# Patient Record
Sex: FEMALE | Race: BLACK OR AFRICAN AMERICAN | NOT HISPANIC OR LATINO | URBAN - METROPOLITAN AREA
[De-identification: names, ages, dates, MRNs, and addresses within clinical notes are randomized per-mention and may not be internally consistent; named-entity substitution may affect disease eponyms.]

---

## 2018-08-25 ENCOUNTER — EMERGENCY (EMERGENCY)
Facility: HOSPITAL | Age: 83
LOS: 0 days | Discharge: HOME | End: 2018-08-25
Attending: EMERGENCY MEDICINE
Payer: MEDICARE

## 2018-08-25 VITALS
HEART RATE: 64 BPM | OXYGEN SATURATION: 99 % | SYSTOLIC BLOOD PRESSURE: 142 MMHG | RESPIRATION RATE: 16 BRPM | WEIGHT: 136.03 LBS | TEMPERATURE: 98 F | DIASTOLIC BLOOD PRESSURE: 72 MMHG | HEIGHT: 65 IN

## 2018-08-25 DIAGNOSIS — R55 SYNCOPE AND COLLAPSE: ICD-10-CM

## 2018-08-25 LAB
ALBUMIN SERPL ELPH-MCNC: 3.5 G/DL — SIGNIFICANT CHANGE UP (ref 3.3–5)
ALP SERPL-CCNC: 82 U/L — SIGNIFICANT CHANGE UP (ref 40–120)
ALT FLD-CCNC: 20 U/L — SIGNIFICANT CHANGE UP (ref 12–78)
ANION GAP SERPL CALC-SCNC: 9 MMOL/L — SIGNIFICANT CHANGE UP (ref 5–17)
APPEARANCE UR: ABNORMAL
APTT BLD: 32.2 SEC — SIGNIFICANT CHANGE UP (ref 27.5–37.4)
AST SERPL-CCNC: 20 U/L — SIGNIFICANT CHANGE UP (ref 15–37)
BILIRUB SERPL-MCNC: 0.4 MG/DL — SIGNIFICANT CHANGE UP (ref 0.2–1.2)
BILIRUB UR-MCNC: NEGATIVE — SIGNIFICANT CHANGE UP
BUN SERPL-MCNC: 18 MG/DL — SIGNIFICANT CHANGE UP (ref 7–23)
CALCIUM SERPL-MCNC: 8.9 MG/DL — SIGNIFICANT CHANGE UP (ref 8.5–10.1)
CHLORIDE SERPL-SCNC: 103 MMOL/L — SIGNIFICANT CHANGE UP (ref 96–108)
CK MB CFR SERPL CALC: 1.3 NG/ML — SIGNIFICANT CHANGE UP (ref 0.5–3.6)
CO2 SERPL-SCNC: 31 MMOL/L — SIGNIFICANT CHANGE UP (ref 22–31)
COLOR SPEC: YELLOW — SIGNIFICANT CHANGE UP
CREAT SERPL-MCNC: 1.07 MG/DL — SIGNIFICANT CHANGE UP (ref 0.5–1.3)
DIFF PNL FLD: ABNORMAL
ETHANOL SERPL-MCNC: <10 MG/DL — SIGNIFICANT CHANGE UP (ref 0–10)
GLUCOSE BLDC GLUCOMTR-MCNC: 142 MG/DL — HIGH (ref 70–99)
GLUCOSE SERPL-MCNC: 151 MG/DL — HIGH (ref 70–99)
GLUCOSE UR QL: NEGATIVE MG/DL — SIGNIFICANT CHANGE UP
HCT VFR BLD CALC: 41.3 % — SIGNIFICANT CHANGE UP (ref 34.5–45)
HGB BLD-MCNC: 12.9 G/DL — SIGNIFICANT CHANGE UP (ref 11.5–15.5)
INR BLD: 1.04 RATIO — SIGNIFICANT CHANGE UP (ref 0.88–1.16)
KETONES UR-MCNC: NEGATIVE — SIGNIFICANT CHANGE UP
LEUKOCYTE ESTERASE UR-ACNC: ABNORMAL
MCHC RBC-ENTMCNC: 27 PG — SIGNIFICANT CHANGE UP (ref 27–34)
MCHC RBC-ENTMCNC: 31.2 GM/DL — LOW (ref 32–36)
MCV RBC AUTO: 86.4 FL — SIGNIFICANT CHANGE UP (ref 80–100)
NITRITE UR-MCNC: NEGATIVE — SIGNIFICANT CHANGE UP
NRBC # BLD: 0 /100 WBCS — SIGNIFICANT CHANGE UP (ref 0–0)
NT-PROBNP SERPL-SCNC: 309 PG/ML — SIGNIFICANT CHANGE UP (ref 0–450)
PCP SPEC-MCNC: SIGNIFICANT CHANGE UP
PH UR: 6 — SIGNIFICANT CHANGE UP (ref 5–8)
PLATELET # BLD AUTO: 181 K/UL — SIGNIFICANT CHANGE UP (ref 150–400)
POTASSIUM SERPL-MCNC: 3.8 MMOL/L — SIGNIFICANT CHANGE UP (ref 3.5–5.3)
POTASSIUM SERPL-SCNC: 3.8 MMOL/L — SIGNIFICANT CHANGE UP (ref 3.5–5.3)
PROT SERPL-MCNC: 7.1 GM/DL — SIGNIFICANT CHANGE UP (ref 6–8.3)
PROT UR-MCNC: 30 MG/DL
PROTHROM AB SERPL-ACNC: 11.3 SEC — SIGNIFICANT CHANGE UP (ref 9.8–12.7)
RBC # BLD: 4.78 M/UL — SIGNIFICANT CHANGE UP (ref 3.8–5.2)
RBC # FLD: 13.6 % — SIGNIFICANT CHANGE UP (ref 10.3–14.5)
SODIUM SERPL-SCNC: 143 MMOL/L — SIGNIFICANT CHANGE UP (ref 135–145)
SP GR SPEC: 1.01 — SIGNIFICANT CHANGE UP (ref 1.01–1.02)
TROPONIN I SERPL-MCNC: <.015 NG/ML — SIGNIFICANT CHANGE UP (ref 0.01–0.04)
UROBILINOGEN FLD QL: 1 MG/DL
WBC # BLD: 4.78 K/UL — SIGNIFICANT CHANGE UP (ref 3.8–10.5)
WBC # FLD AUTO: 4.78 K/UL — SIGNIFICANT CHANGE UP (ref 3.8–10.5)

## 2018-08-25 PROCEDURE — 71045 X-RAY EXAM CHEST 1 VIEW: CPT | Mod: 26

## 2018-08-25 PROCEDURE — 99285 EMERGENCY DEPT VISIT HI MDM: CPT

## 2018-08-25 PROCEDURE — 70450 CT HEAD/BRAIN W/O DYE: CPT | Mod: 26

## 2018-08-25 RX ORDER — SODIUM CHLORIDE 9 MG/ML
1000 INJECTION INTRAMUSCULAR; INTRAVENOUS; SUBCUTANEOUS ONCE
Qty: 0 | Refills: 0 | Status: COMPLETED | OUTPATIENT
Start: 2018-08-25 | End: 2018-08-25

## 2018-08-25 RX ADMIN — SODIUM CHLORIDE 1000 MILLILITER(S): 9 INJECTION INTRAMUSCULAR; INTRAVENOUS; SUBCUTANEOUS at 19:45

## 2018-08-25 NOTE — ED ADULT NURSE NOTE - NSFALLRSKUNASSIST_ED_ALL_ED
[address]  Social Work Navigator Encounter     Patient Name:  Montey Riedel    Medical History:     Advance Directives:    Narrative: [facility] faxed DDNR for oncologist to Sign - NP Cathryne Sample signed and PSR scanned into the chart. SW contacted hospice and informed them the document was now in patients chart. Barriers to Care:     Plan:   1. Continue to provide support as needed. no

## 2018-08-25 NOTE — ED ADULT NURSE NOTE - NSIMPLEMENTINTERV_GEN_ALL_ED
Implemented All Universal Safety Interventions:  [place] to call system. Call bell, personal items and telephone within reach. Instruct patient to call for assistance. Room bathroom lighting operational. Non-slip footwear when patient is off stretcher. Physically safe environment: no spills, clutter or unnecessary equipment. Stretcher in lowest position, wheels locked, appropriate side rails in place.

## 2018-08-25 NOTE — ED PROVIDER NOTE - PROGRESS NOTE DETAILS
Cliff: pt signed out to me syncope while sitting in heat, pending ct and labs. pt with several syncopal episodes over past few months, has been admitted several times, had eeg's, full cardio work up all of which has been neg perpt. states has pcp appointment in 1 day (this monday), and has continued neuro/cardiology. feels completely fine and would like to go home if ED work up is negative. Cliff: labs, ct, xr, and urine without significant findings. VS wnl, pt feels at baseline and states this was no different from prior events for which she has had extensive work up and has close pcp f/u. States usually happens when she is in heat like today. given results, and strict return precautions, pt requesting d/c. stable or d/c.

## 2018-08-25 NOTE — ED ADULT TRIAGE NOTE - CHIEF COMPLAINT QUOTE
as per ems pt synopsized while at the park. history of htn and asthma. pt states she remembers sitting on the park bench when she started feeling hot. does not remember anything else. as per ems pt did not fall. she was found in a sitting position on the bench. no slureed speech, facial droop or weakness noted at triage. .

## 2018-08-25 NOTE — ED PROVIDER NOTE - MEDICAL DECISION MAKING DETAILS
88 yo F syncope on park bench, no deficits currently, at baseline  -basic labs, bnp, trop, drug screen, etoh, ckmb, coags, ua, cx, ekg, cxr, ct brain  -hydration with NS, finger stick, monitor in ER, rectal temp if pt. allows  -f/u results, reeval

## 2018-08-25 NOTE — ED PROVIDER NOTE - PHYSICAL EXAMINATION
Vitals: WNL  Gen: AAOx3, NAD, sitting comfortably in stretcher, calm, cooperative, non-toxic, pleasant demeanor  Head: ncat, perrla, eomi b/l  Neck: supple, no lymphadenopathy, no midline deviation  Heart: rrr, no m/r/g  Lungs: CTA b/l, no rales/ronchi/wheezes  Abd: soft, nontender, non-distended, no rebound or guarding  Ext: no clubbing/cyanosis/edema  Neuro: sensation and muscle strength intact b/l, CN2-12 intact b/l

## 2018-08-25 NOTE — ED PROVIDER NOTE - OBJECTIVE STATEMENT
86 yo F bibems after passing out in park while sitting on bench.  Pt. says she felt fine, she was just sitting outside.  The next thing she remembers was waking up in the ambulance.  She's not sure how long she was down for, no inciting event, stressor, cp or palpitations prior to the event.  She was in her normal state of health.  No other effects, currently.  ROS: negative for fever, cough, headache, chest pain, shortness of breath, abd pain, nausea, vomiting, diarrhea, rash, paresthesia, and weakness--all other systems reviewed are negative.   PMH: asthma, HTN; Meds: See EMR; SH: Denies smoking/drinking/drug use 86 yo F bibems after passing out in park while sitting on bench.  Pt. says she felt fine, she was just sitting outside.  The next thing she remembers was waking up in the ambulance.  She's not sure how long she was down for, no inciting event, stressor, cp or palpitations prior to the event.  She was in her normal state of health.  No other effects, currently.  Per EMS report, pt. found unresponsive on park bench after being in the sun "all day."  ROS: negative for fever, cough, headache, chest pain, shortness of breath, abd pain, nausea, vomiting, diarrhea, rash, paresthesia, and weakness--all other systems reviewed are negative.   PMH: asthma, HTN; Meds: See EMR; SH: Denies smoking/drinking/drug use

## 2018-08-26 LAB
CULTURE RESULTS: SIGNIFICANT CHANGE UP
SPECIMEN SOURCE: SIGNIFICANT CHANGE UP

## 2023-05-18 ENCOUNTER — INPATIENT (INPATIENT)
Facility: HOSPITAL | Age: 88
LOS: 3 days | Discharge: HOME | End: 2023-05-22
Attending: HOSPITALIST | Admitting: HOSPITALIST
Payer: MEDICARE

## 2023-05-18 VITALS
TEMPERATURE: 98 F | HEART RATE: 110 BPM | DIASTOLIC BLOOD PRESSURE: 71 MMHG | SYSTOLIC BLOOD PRESSURE: 157 MMHG | OXYGEN SATURATION: 94 % | RESPIRATION RATE: 17 BRPM

## 2023-05-18 DIAGNOSIS — K52.9 NONINFECTIVE GASTROENTERITIS AND COLITIS, UNSPECIFIED: ICD-10-CM

## 2023-05-18 LAB
ALBUMIN SERPL ELPH-MCNC: 4.6 G/DL — SIGNIFICANT CHANGE UP (ref 3.3–5)
ALP SERPL-CCNC: 85 U/L — SIGNIFICANT CHANGE UP (ref 40–120)
ALT FLD-CCNC: 11 U/L — SIGNIFICANT CHANGE UP (ref 4–33)
ANION GAP SERPL CALC-SCNC: 14 MMOL/L — SIGNIFICANT CHANGE UP (ref 7–14)
AST SERPL-CCNC: 20 U/L — SIGNIFICANT CHANGE UP (ref 4–32)
BASOPHILS # BLD AUTO: 0.03 K/UL — SIGNIFICANT CHANGE UP (ref 0–0.2)
BASOPHILS NFR BLD AUTO: 0.5 % — SIGNIFICANT CHANGE UP (ref 0–2)
BILIRUB SERPL-MCNC: 0.5 MG/DL — SIGNIFICANT CHANGE UP (ref 0.2–1.2)
BUN SERPL-MCNC: 13 MG/DL — SIGNIFICANT CHANGE UP (ref 7–23)
CALCIUM SERPL-MCNC: 10.1 MG/DL — SIGNIFICANT CHANGE UP (ref 8.4–10.5)
CHLORIDE SERPL-SCNC: 98 MMOL/L — SIGNIFICANT CHANGE UP (ref 98–107)
CO2 SERPL-SCNC: 29 MMOL/L — SIGNIFICANT CHANGE UP (ref 22–31)
CREAT SERPL-MCNC: 0.83 MG/DL — SIGNIFICANT CHANGE UP (ref 0.5–1.3)
EGFR: 66 ML/MIN/1.73M2 — SIGNIFICANT CHANGE UP
EOSINOPHIL # BLD AUTO: 0.12 K/UL — SIGNIFICANT CHANGE UP (ref 0–0.5)
EOSINOPHIL NFR BLD AUTO: 2 % — SIGNIFICANT CHANGE UP (ref 0–6)
GLUCOSE SERPL-MCNC: 185 MG/DL — HIGH (ref 70–99)
HCT VFR BLD CALC: 46.4 % — HIGH (ref 34.5–45)
HGB BLD-MCNC: 14.4 G/DL — SIGNIFICANT CHANGE UP (ref 11.5–15.5)
IANC: 4.33 K/UL — SIGNIFICANT CHANGE UP (ref 1.8–7.4)
IMM GRANULOCYTES NFR BLD AUTO: 0.2 % — SIGNIFICANT CHANGE UP (ref 0–0.9)
LIDOCAIN IGE QN: 50 U/L — SIGNIFICANT CHANGE UP (ref 7–60)
LYMPHOCYTES # BLD AUTO: 1.09 K/UL — SIGNIFICANT CHANGE UP (ref 1–3.3)
LYMPHOCYTES # BLD AUTO: 18.4 % — SIGNIFICANT CHANGE UP (ref 13–44)
MAGNESIUM SERPL-MCNC: 2.3 MG/DL — SIGNIFICANT CHANGE UP (ref 1.6–2.6)
MCHC RBC-ENTMCNC: 26.3 PG — LOW (ref 27–34)
MCHC RBC-ENTMCNC: 31 GM/DL — LOW (ref 32–36)
MCV RBC AUTO: 84.8 FL — SIGNIFICANT CHANGE UP (ref 80–100)
MONOCYTES # BLD AUTO: 0.33 K/UL — SIGNIFICANT CHANGE UP (ref 0–0.9)
MONOCYTES NFR BLD AUTO: 5.6 % — SIGNIFICANT CHANGE UP (ref 2–14)
NEUTROPHILS # BLD AUTO: 4.33 K/UL — SIGNIFICANT CHANGE UP (ref 1.8–7.4)
NEUTROPHILS NFR BLD AUTO: 73.3 % — SIGNIFICANT CHANGE UP (ref 43–77)
NRBC # BLD: 0 /100 WBCS — SIGNIFICANT CHANGE UP (ref 0–0)
NRBC # FLD: 0 K/UL — SIGNIFICANT CHANGE UP (ref 0–0)
PHOSPHATE SERPL-MCNC: 3.6 MG/DL — SIGNIFICANT CHANGE UP (ref 2.5–4.5)
PLATELET # BLD AUTO: 250 K/UL — SIGNIFICANT CHANGE UP (ref 150–400)
POTASSIUM SERPL-MCNC: 3.6 MMOL/L — SIGNIFICANT CHANGE UP (ref 3.5–5.3)
POTASSIUM SERPL-SCNC: 3.6 MMOL/L — SIGNIFICANT CHANGE UP (ref 3.5–5.3)
PROT SERPL-MCNC: 7.9 G/DL — SIGNIFICANT CHANGE UP (ref 6–8.3)
RBC # BLD: 5.47 M/UL — HIGH (ref 3.8–5.2)
RBC # FLD: 14 % — SIGNIFICANT CHANGE UP (ref 10.3–14.5)
SODIUM SERPL-SCNC: 141 MMOL/L — SIGNIFICANT CHANGE UP (ref 135–145)
TROPONIN T, HIGH SENSITIVITY RESULT: 21 NG/L — SIGNIFICANT CHANGE UP
TROPONIN T, HIGH SENSITIVITY RESULT: 25 NG/L — SIGNIFICANT CHANGE UP
WBC # BLD: 5.91 K/UL — SIGNIFICANT CHANGE UP (ref 3.8–10.5)
WBC # FLD AUTO: 5.91 K/UL — SIGNIFICANT CHANGE UP (ref 3.8–10.5)

## 2023-05-18 PROCEDURE — 71045 X-RAY EXAM CHEST 1 VIEW: CPT | Mod: 26

## 2023-05-18 PROCEDURE — 99223 1ST HOSP IP/OBS HIGH 75: CPT

## 2023-05-18 PROCEDURE — 74177 CT ABD & PELVIS W/CONTRAST: CPT | Mod: 26,MA

## 2023-05-18 PROCEDURE — 99285 EMERGENCY DEPT VISIT HI MDM: CPT

## 2023-05-18 RX ORDER — PIPERACILLIN AND TAZOBACTAM 4; .5 G/20ML; G/20ML
3.38 INJECTION, POWDER, LYOPHILIZED, FOR SOLUTION INTRAVENOUS ONCE
Refills: 0 | Status: COMPLETED | OUTPATIENT
Start: 2023-05-18 | End: 2023-05-18

## 2023-05-18 RX ADMIN — PIPERACILLIN AND TAZOBACTAM 200 GRAM(S): 4; .5 INJECTION, POWDER, LYOPHILIZED, FOR SOLUTION INTRAVENOUS at 22:05

## 2023-05-18 NOTE — H&P ADULT - NSHPPHYSICALEXAM_GEN_ALL_CORE
Vital Signs Last 24 Hrs  T(C): 36.4 (18 May 2023 22:00), Max: 37.2 (18 May 2023 18:03)  T(F): 97.6 (18 May 2023 22:00), Max: 98.9 (18 May 2023 18:03)  HR: 71 (18 May 2023 22:00) (71 - 110)  BP: 159/75 (18 May 2023 22:00) (157/71 - 166/72)  RR: 20 (18 May 2023 22:00) (17 - 20)  SpO2: 96% (18 May 2023 22:00) (94% - 98%)    Parameters below as of 18 May 2023 22:00  Patient On (Oxygen Delivery Method): room air Vital Signs Last 24 Hrs  T(C): 36.4 (18 May 2023 22:00), Max: 37.2 (18 May 2023 18:03)  T(F): 97.6 (18 May 2023 22:00), Max: 98.9 (18 May 2023 18:03)  HR: 71 (18 May 2023 22:00) (71 - 110)  BP: 159/75 (18 May 2023 22:00) (157/71 - 166/72)  RR: 20 (18 May 2023 22:00) (17 - 20)  SpO2: 96% (18 May 2023 22:00) (94% - 98%)    Parameters below as of 18 May 2023 22:00  Patient On (Oxygen Delivery Method): room air    PHYSICAL EXAM:  GENERAL: NAD, well-developed, well-nourished  HEAD:  Atraumatic, Normocephalic  EYES: conjunctiva and sclera clear  NECK: Supple, No JVD  CHEST/LUNG: Clear to auscultation bilaterally; No wheezes, rales or rhonchi; normal work of breathing, speaking in full sentences  HEART: Regular rate and rhythm; No murmurs, rubs, or gallops, (+)S1, S2  ABDOMEN: Soft, Nondistended; Normal Bowel sounds; (+) mild epigastric TTP  EXTREMITIES:  2+ Peripheral Pulses, No clubbing, cyanosis, or edema  PSYCH: normal mood and affect, A&Ox3  NEUROLOGY: no focal neuro deficits  SKIN: No rashes or lesions

## 2023-05-18 NOTE — H&P ADULT - HISTORY OF PRESENT ILLNESS
89-year-old female with     In the ED VS:  98.9    157-166/71-72  17  94-98%RA, received pip/tazo 3.375g IVPB x1 89-year-old female with asthma, breast cancer s/p excision/RT, HTN, presenting from home with an episode of presyncope that patient reports starting upon standing. She notes feeling associated nausea and vomiting. She denies any abdominal pain. She reports constipation x2d, took Miralax yesterday and noted 2 large BM earlier today followed by dark liquid stool. She denies any sick contacts, history of travel (aside from traveling from [place] approximately 1 month ago), or any changes in diet. Patient reports possible remote history of colitis in the past. Of note, patient reports recent hemorrhoidectomy approximately 2 weeks ago in [place] (timing conflicting with when she reports returning from [place], 1 month ago).    In the ED VS:  98.9    157-166/71-72  17  94-98%RA, received pip/tazo 3.375g IVPB x1

## 2023-05-18 NOTE — ED PROVIDER NOTE - OBJECTIVE STATEMENT
89 year old female with hx of asthma, breast cancer, and HTN comes into the ED for eval of lightheadedness with episode of vomiting earlier today. She states she did not have an LOC but she felt presyncopal which lasted for a few mins. After the sensation improved, she proceeded to have an episode of vomiting and diarrhea. She has not had any sensation of lightheadedness or dizziness since, she denies any chest pain, palpitations, or SOB. She has not had any recent fevers, chills, abd pain, or urinary symptoms.

## 2023-05-18 NOTE — ED PROVIDER NOTE - PHYSICAL EXAMINATION
GENERAL: Not in acute distress, non-toxic appearing  HEAD: normocephalic, atraumatic  HEENT: PERRLA, EOMI, normal conjunctiva, oral mucosa moist, neck supple  CARDIAC: regular rate and rhythm, normal S1 and S2,  no appreciable murmurs  PULM: clear to ascultation bilaterally, no crackles, rales, rhonchi, or wheezing  GI: abdomen nondistended, soft, nontender, no guarding or rebound tenderness  : No CVA tenderness, no suprapubic tenderness  NEURO: alert and oriented x 3, normal speech, no focal motor or sensory deficits, gait normal, no gross neurologic deficit  MSK: No visible deformities, no peripheral edema, no calf tenderness/redness/swelling  SKIN: No visible rashes, dry, well-perfused  PSYCH: appropriate mood and affect

## 2023-05-18 NOTE — H&P ADULT - PROBLEM SELECTOR PLAN 1
descending/rectosigmoid colitis  tachycardic on presentation, resolved  s/p pip/tazo in ED, will monitor off abx for now  check stool studies, clear liquid diet for bowel rest   monitor stool counts descending/rectosigmoid colitis  tachycardic on presentation, resolved  s/p pip/tazo in ED, will monitor off abx for now  check stool studies, clear liquid diet for bowel rest   monitor stool counts  reports constipation x2d then took Miralax with 2 large formed BM followed by diarrhea

## 2023-05-18 NOTE — ED PROVIDER NOTE - PROGRESS NOTE DETAILS
Pt is now complaining of abd pain and has LLQ tenderness. She had another episode of diarrhea. Will send a GI PCR and CT scan abd Patricia CRUZ PGY-3: CT of the abdomen and pelvis significant for Colitis.  Patient reassessed at bedside, abdomen soft,  nontender and nondistended.  Patient states that she does not have any abdominal pain currently.  Patient would like to go home.   Will call and update family, and send antibiotics to pharmacy. Patricia CRUZ PGY-3: patient states that she lives with georgie at home who is away for the weekend and normally takes care of patient. will admit patient for iv abx.

## 2023-05-18 NOTE — H&P ADULT - ASSESSMENT
89-year-old female with asthma, breast cancer s/p excision/RT, HTN, presenting from home with an episode of presyncope, also found to have colitis.

## 2023-05-18 NOTE — ED PROVIDER NOTE - CLINICAL SUMMARY MEDICAL DECISION MAKING FREE TEXT BOX
Dawn: Witnessed syncopal episode at home f/b N/V/D. No pain. No CP/SOB/HA. Check EKG and trop and Hb. Likely admit vs. CDU.

## 2023-05-18 NOTE — ED ADULT NURSE REASSESSMENT NOTE - NS ED NURSE REASSESS COMMENT FT1
Report received from RNShirley. Pt appears comfortable in stretcher at this time, HOB elevated. NSR on bedside cardiac monitor. Respirations even and unlabored. 99% o2 saturation on room air. No acute distress noted. Speaking in full and complete sentences. Offers no complaint at this time. Denies CP, SOB, nausea, vomiting, headache, lightheadedness, dizziness, fever or chills. Bed in lowest position, call bell in reach, wheels locked, appropriate side rails up, safety maintained. Awaiting bed assignment as per admission orders.

## 2023-05-18 NOTE — H&P ADULT - PROBLEM SELECTOR PLAN 5
HSQ for DVT Ppx  PT consult to prevent deconditioning HSQ for DVT Ppx  PT consult to prevent deconditioning    CT showing possible cystitis, patient denies urinary symptoms, was recently treated for UTI a few weeks ago s/p 7d course of nitrofurantoin, UA Pending

## 2023-05-18 NOTE — ED PROVIDER NOTE - ATTENDING CONTRIBUTION TO CARE
I performed a face-to-face evaluation of the patient and performed a history and physical examination. I agree with the history and physical examination. If this was a PA visit, I personally saw the patient with the PA and performed a substantive portion of the visit including all aspects of the medical decision making.    Witnessed syncopal episode at home f/b N/V/D. No pain. No CP/SOB/HA. Check EKG and trop and Hb. Likely admit vs. CDU.

## 2023-05-18 NOTE — ED ADULT NURSE NOTE - OBJECTIVE STATEMENT
patient presents to ed c/o dizziness. endorsed feeling as if she was going to "pass out". pmhx of vertigo. reports feeling nauseous. denies cp, sob, vomiting or fever/chills

## 2023-05-18 NOTE — H&P ADULT - PROBLEM SELECTOR PLAN 2
patient reporting dizziness upon standing/presyncope, no LOC  check orthostatics, pending findings can check TTE  monitor on tele patient reporting dizziness upon standing/presyncope, no LOC  check orthostatics, pending findings can check TTE  monitor on tele  trop 25->21, no chest pain patient reporting dizziness upon standing/presyncope, no LOC  check orthostatics, pending findings can check TTE (reportedly had one recently performed in [place] 2mo ago)  monitor on tele  [email or web address] 25->21, no chest pain

## 2023-05-18 NOTE — ED ADULT TRIAGE NOTE - CHIEF COMPLAINT QUOTE
Pt brought in after witnessed syncopal episode at home. After regained consciousness pt had episode of nausea, vomiting, and diarrhea. Unable to obtain health information, FS by ems 170. Pt actively having diarrhea in triage. Charge R made aware.

## 2023-05-18 NOTE — H&P ADULT - NSHPLABSRESULTS_GEN_ALL_CORE
14.4   5.91  )-----------( 250      ( 18 May 2023 12:35 )             46.4     05-18    141  |  98  |  13  ----------------------------<  185<H>  3.6   |  29  |  0.83    Ca    10.1      18 May 2023 12:50  Phos  3.6     05-18  Mg     2.30     05-18    TPro  7.9  /  Alb  4.6  /  TBili  0.5  /  DBili  x   /  AST  20  /  ALT  11  /  AlkPhos  85  05-18    Lipase, Serum: 50 U/L (05.18.23 @ 12:50)    Troponin T, High Sensitivity Result: 21 ng/L (05.18.23 @ 15:12)  Troponin T, High Sensitivity Result: 25 ng/L (05.18.23 @ 12:50)    < from: Xray Chest 1 View- PORTABLE-Urgent (05.18.23 @ 15:22) >  LUNGS/PLEURA: The lungs are clear. No pleural effusion. No pneumothorax.  HEART AND MEDIASTINUM: The heart size is not accurately measured in this   projection.  OTHER: No acute osseous abnormalities.  IMPRESSION: Clear lungs.  < end of copied text >    < from: CT Abdomen and Pelvis w/ IV Cont (05.18.23 @ 17:44) >  LOWER CHEST: A 1.7 cm right breast nodule with coarse calcifications, which may correlate with the patient's history of breast cancer. Bibasilar dependent atelectasis.  LIVER: Within normal limits.  BILE DUCTS: Normal caliber.  GALLBLADDER: Within normal limits.  SPLEEN: Within normal limits.  PANCREAS: A 0.8 cm pancreatic neck cyst (301:36). Main pancreatic duct is normal in caliber.  ADRENALS: Within normal limits.  KIDNEYS/URETERS: Symmetrically enhancing parenchyma. Bilateral cysts. No hydronephrosis.  BLADDER: Circumferential bladder wall thickening that is disproportionate to underdistention, which may reflect cystitis, neurogenic bladder or a   combination of both.  REPRODUCTIVE ORGANS: Fibroid uterus. A 2.3 cm right adnexal cyst.  BOWEL: Descending and rectosigmoid colonic wall thickening with surrounding inflammatory changes, more prominent in the rectum, suggesting colitis. No bowel obstruction. Appendix is normal.  PERITONEUM: No ascites.  VESSELS: Atherosclerotic changes.  RETROPERITONEUM/LYMPH NODES: No lymphadenopathy.  ABDOMINAL WALL: Within normal limits.  BONES: Degenerative changes. Subacute/chronic right lower rib fractures.  IMPRESSION: Descending and rectosigmoid colitis. No bowel obstruction. Circumferential bladder wall thickening disproportionate to underdistention may reflect cystitis, neurogenic bladder or a combination of both. Correlate with urinalysis. A 1.7 cm right breast nodule may correlate with patient's history of breast cancer. Mammographic correlation is advised.   < end of copied text > 14.4   5.91  )-----------( 250      ( 18 May 2023 12:35 )             46.4     05-18    141  |  98  |  13  ----------------------------<  185<H>  3.6   |  29  |  0.83    Ca    10.1      18 May 2023 12:50  Phos  3.6     05-18  Mg     2.30     05-18    TPro  7.9  /  Alb  4.6  /  TBili  0.5  /  DBili  x   /  AST  20  /  ALT  11  /  AlkPhos  85  05-18    Lipase, Serum: 50 U/L (05.18.23 @ 12:50)    Troponin T, High Sensitivity Result: 21 ng/L (05.18.23 @ 15:12)  Troponin T, High Sensitivity Result: 25 ng/L (05.18.23 @ 12:50)    < from: Xray Chest 1 View- PORTABLE-Urgent (05.18.23 @ 15:22) >  LUNGS/PLEURA: The lungs are clear. No pleural effusion. No pneumothorax.  HEART AND MEDIASTINUM: The heart size is not accurately measured in this   projection.  OTHER: No acute osseous abnormalities.  IMPRESSION: Clear lungs.  < end of copied text >    < from: CT Abdomen and Pelvis w/ IV Cont (05.18.23 @ 17:44) >  LOWER CHEST: A 1.7 cm right breast nodule with coarse calcifications, which may correlate with the patient's history of breast cancer. Bibasilar dependent atelectasis.  LIVER: Within normal limits.  BILE DUCTS: Normal caliber.  GALLBLADDER: Within normal limits.  SPLEEN: Within normal limits.  PANCREAS: A 0.8 cm pancreatic neck cyst (301:36). Main pancreatic duct is normal in caliber.  ADRENALS: Within normal limits.  KIDNEYS/URETERS: Symmetrically enhancing parenchyma. Bilateral cysts. No hydronephrosis.  BLADDER: Circumferential bladder wall thickening that is disproportionate to underdistention, which may reflect cystitis, neurogenic bladder or a   combination of both.  REPRODUCTIVE ORGANS: Fibroid uterus. A 2.3 cm right adnexal cyst.  BOWEL: Descending and rectosigmoid colonic wall thickening with surrounding inflammatory changes, more prominent in the rectum, suggesting colitis. No bowel obstruction. Appendix is normal.  PERITONEUM: No ascites.  VESSELS: Atherosclerotic changes.  RETROPERITONEUM/LYMPH NODES: No lymphadenopathy.  ABDOMINAL WALL: Within normal limits.  BONES: Degenerative changes. Subacute/chronic right lower rib fractures.  IMPRESSION: Descending and rectosigmoid colitis. No bowel obstruction. Circumferential bladder wall thickening disproportionate to underdistention may reflect cystitis, neurogenic bladder or a combination of both. Correlate with urinalysis. A 1.7 cm right breast nodule may correlate with patient's history of breast cancer. Mammographic correlation is advised.   < end of copied text >    EKG personally reviewed and interpreted - NSR 68bpm, LAD, LVH, TWI II, III, aVF, biphasic T in V3, V5-V6; QTc 461ms (no prior EKGs available for comparison)

## 2023-05-18 NOTE — H&P ADULT - TIME BILLING
Preparing to see the patient including review of tests and other providers' notes, confirming history with patient, performing medical examination and evaluation, counseling and educating the patient, ordering medications, tests and procedures, documenting clinical information in the EMR, independently interpreting results and communicating results to the patient, care coordination

## 2023-05-18 NOTE — H&P ADULT - NSHPREVIEWOFSYSTEMS_GEN_ALL_CORE
REVIEW OF SYSTEMS:    CONSTITUTIONAL: No weakness, fevers or chills  EYES/ENT: No visual changes; No dysphagia; No sore throat; No rhinorrhea; No sinus pain/pressure  NECK: No pain or stiffness  RESPIRATORY: No cough, wheezing, hemoptysis; No shortness of breath  CARDIOVASCULAR: No chest pain or palpitations; No lower extremity edema  GASTROINTESTINAL: No abdominal or epigastric pain. (+) nausea, vomiting; No hematemesis; (+) constipation - resolved s/p Miralax yesterday, now w/diarrhea. No melena or hematochezia.  GENITOURINARY: No dysuria, frequency or hematuria  NEUROLOGICAL: No numbness, paresthesias, or weakness; No HA; (+) LH/dizziness  MSK: ambulates with cane; No falls  SKIN: No itching, burning, rashes, or lesions   All other review of systems is negative unless indicated above.

## 2023-05-18 NOTE — ED ADULT NURSE NOTE - NSFALLHARMRISKINTERV_ED_ALL_ED

## 2023-05-19 DIAGNOSIS — N63.0 UNSPECIFIED LUMP IN UNSPECIFIED BREAST: ICD-10-CM

## 2023-05-19 DIAGNOSIS — K86.2 CYST OF PANCREAS: ICD-10-CM

## 2023-05-19 DIAGNOSIS — Z98.890 OTHER SPECIFIED POSTPROCEDURAL STATES: Chronic | ICD-10-CM

## 2023-05-19 DIAGNOSIS — K52.9 NONINFECTIVE GASTROENTERITIS AND COLITIS, UNSPECIFIED: ICD-10-CM

## 2023-05-19 DIAGNOSIS — Z29.9 ENCOUNTER FOR PROPHYLACTIC MEASURES, UNSPECIFIED: ICD-10-CM

## 2023-05-19 DIAGNOSIS — R42 DIZZINESS AND GIDDINESS: ICD-10-CM

## 2023-05-19 LAB
A1C WITH ESTIMATED AVERAGE GLUCOSE RESULT: 7 % — HIGH (ref 4–5.6)
ANION GAP SERPL CALC-SCNC: 13 MMOL/L — SIGNIFICANT CHANGE UP (ref 7–14)
BASOPHILS # BLD AUTO: 0.04 K/UL — SIGNIFICANT CHANGE UP (ref 0–0.2)
BASOPHILS NFR BLD AUTO: 0.7 % — SIGNIFICANT CHANGE UP (ref 0–2)
BUN SERPL-MCNC: 14 MG/DL — SIGNIFICANT CHANGE UP (ref 7–23)
CALCIUM SERPL-MCNC: 9.4 MG/DL — SIGNIFICANT CHANGE UP (ref 8.4–10.5)
CHLORIDE SERPL-SCNC: 97 MMOL/L — LOW (ref 98–107)
CHOLEST SERPL-MCNC: 192 MG/DL — SIGNIFICANT CHANGE UP
CO2 SERPL-SCNC: 27 MMOL/L — SIGNIFICANT CHANGE UP (ref 22–31)
CREAT SERPL-MCNC: 0.87 MG/DL — SIGNIFICANT CHANGE UP (ref 0.5–1.3)
EGFR: 62 ML/MIN/1.73M2 — SIGNIFICANT CHANGE UP
EOSINOPHIL # BLD AUTO: 0.14 K/UL — SIGNIFICANT CHANGE UP (ref 0–0.5)
EOSINOPHIL NFR BLD AUTO: 2.4 % — SIGNIFICANT CHANGE UP (ref 0–6)
ESTIMATED AVERAGE GLUCOSE: 154 — SIGNIFICANT CHANGE UP
GLUCOSE SERPL-MCNC: 90 MG/DL — SIGNIFICANT CHANGE UP (ref 70–99)
HCT VFR BLD CALC: 39.9 % — SIGNIFICANT CHANGE UP (ref 34.5–45)
HDLC SERPL-MCNC: 52 MG/DL — SIGNIFICANT CHANGE UP
HGB BLD-MCNC: 13.1 G/DL — SIGNIFICANT CHANGE UP (ref 11.5–15.5)
IANC: 2.98 K/UL — SIGNIFICANT CHANGE UP (ref 1.8–7.4)
IMM GRANULOCYTES NFR BLD AUTO: 0.2 % — SIGNIFICANT CHANGE UP (ref 0–0.9)
LIPID PNL WITH DIRECT LDL SERPL: 124 MG/DL — HIGH
LYMPHOCYTES # BLD AUTO: 2.08 K/UL — SIGNIFICANT CHANGE UP (ref 1–3.3)
LYMPHOCYTES # BLD AUTO: 35.1 % — SIGNIFICANT CHANGE UP (ref 13–44)
MAGNESIUM SERPL-MCNC: 1.7 MG/DL — SIGNIFICANT CHANGE UP (ref 1.6–2.6)
MCHC RBC-ENTMCNC: 27.2 PG — SIGNIFICANT CHANGE UP (ref 27–34)
MCHC RBC-ENTMCNC: 32.8 GM/DL — SIGNIFICANT CHANGE UP (ref 32–36)
MCV RBC AUTO: 82.8 FL — SIGNIFICANT CHANGE UP (ref 80–100)
MONOCYTES # BLD AUTO: 0.68 K/UL — SIGNIFICANT CHANGE UP (ref 0–0.9)
MONOCYTES NFR BLD AUTO: 11.5 % — SIGNIFICANT CHANGE UP (ref 2–14)
NEUTROPHILS # BLD AUTO: 2.98 K/UL — SIGNIFICANT CHANGE UP (ref 1.8–7.4)
NEUTROPHILS NFR BLD AUTO: 50.1 % — SIGNIFICANT CHANGE UP (ref 43–77)
NON HDL CHOLESTEROL: 140 MG/DL — HIGH
NRBC # BLD: 0 /100 WBCS — SIGNIFICANT CHANGE UP (ref 0–0)
NRBC # FLD: 0 K/UL — SIGNIFICANT CHANGE UP (ref 0–0)
PHOSPHATE SERPL-MCNC: 3.2 MG/DL — SIGNIFICANT CHANGE UP (ref 2.5–4.5)
PLATELET # BLD AUTO: 202 K/UL — SIGNIFICANT CHANGE UP (ref 150–400)
POTASSIUM SERPL-MCNC: 3.2 MMOL/L — LOW (ref 3.5–5.3)
POTASSIUM SERPL-SCNC: 3.2 MMOL/L — LOW (ref 3.5–5.3)
RBC # BLD: 4.82 M/UL — SIGNIFICANT CHANGE UP (ref 3.8–5.2)
RBC # FLD: 13.8 % — SIGNIFICANT CHANGE UP (ref 10.3–14.5)
SODIUM SERPL-SCNC: 137 MMOL/L — SIGNIFICANT CHANGE UP (ref 135–145)
TRIGL SERPL-MCNC: 78 MG/DL — SIGNIFICANT CHANGE UP
TSH SERPL-MCNC: 0.33 UIU/ML — SIGNIFICANT CHANGE UP (ref 0.27–4.2)
WBC # BLD: 5.93 K/UL — SIGNIFICANT CHANGE UP (ref 3.8–10.5)
WBC # FLD AUTO: 5.93 K/UL — SIGNIFICANT CHANGE UP (ref 3.8–10.5)

## 2023-05-19 PROCEDURE — 99233 SBSQ HOSP IP/OBS HIGH 50: CPT

## 2023-05-19 RX ORDER — HEPARIN SODIUM 5000 [USP'U]/ML
5000 INJECTION INTRAVENOUS; SUBCUTANEOUS EVERY 12 HOURS
Refills: 0 | Status: DISCONTINUED | OUTPATIENT
Start: 2023-05-19 | End: 2023-05-22

## 2023-05-19 RX ORDER — AMLODIPINE BESYLATE 2.5 MG/1
0 TABLET ORAL
Qty: 0 | Refills: 1 | DISCHARGE

## 2023-05-19 RX ORDER — NITROFURANTOIN MACROCRYSTAL 50 MG
0 CAPSULE ORAL
Qty: 0 | Refills: 0 | DISCHARGE

## 2023-05-19 RX ORDER — POTASSIUM CHLORIDE 20 MEQ
40 PACKET (EA) ORAL ONCE
Refills: 0 | Status: COMPLETED | OUTPATIENT
Start: 2023-05-19 | End: 2023-05-19

## 2023-05-19 RX ORDER — CARVEDILOL PHOSPHATE 80 MG/1
6.25 CAPSULE, EXTENDED RELEASE ORAL EVERY 12 HOURS
Refills: 0 | Status: DISCONTINUED | OUTPATIENT
Start: 2023-05-19 | End: 2023-05-22

## 2023-05-19 RX ORDER — DULOXETINE HYDROCHLORIDE 30 MG/1
0 CAPSULE, DELAYED RELEASE ORAL
Qty: 0 | Refills: 0 | DISCHARGE

## 2023-05-19 RX ORDER — ASPIRIN/CALCIUM CARB/MAGNESIUM 324 MG
81 TABLET ORAL DAILY
Refills: 0 | Status: DISCONTINUED | OUTPATIENT
Start: 2023-05-19 | End: 2023-05-22

## 2023-05-19 RX ORDER — HYDROCORTISONE 1 %
0 OINTMENT (GRAM) TOPICAL
Qty: 0 | Refills: 0 | DISCHARGE

## 2023-05-19 RX ORDER — MECLIZINE HCL 12.5 MG
25 TABLET ORAL EVERY 8 HOURS
Refills: 0 | Status: DISCONTINUED | OUTPATIENT
Start: 2023-05-19 | End: 2023-05-22

## 2023-05-19 RX ADMIN — CARVEDILOL PHOSPHATE 6.25 MILLIGRAM(S): 80 CAPSULE, EXTENDED RELEASE ORAL at 17:39

## 2023-05-19 RX ADMIN — HEPARIN SODIUM 5000 UNIT(S): 5000 INJECTION INTRAVENOUS; SUBCUTANEOUS at 17:39

## 2023-05-19 RX ADMIN — Medication 81 MILLIGRAM(S): at 12:29

## 2023-05-19 RX ADMIN — Medication 40 MILLIEQUIVALENT(S): at 08:51

## 2023-05-19 RX ADMIN — CARVEDILOL PHOSPHATE 6.25 MILLIGRAM(S): 80 CAPSULE, EXTENDED RELEASE ORAL at 06:09

## 2023-05-19 RX ADMIN — HEPARIN SODIUM 5000 UNIT(S): 5000 INJECTION INTRAVENOUS; SUBCUTANEOUS at 06:09

## 2023-05-19 NOTE — PATIENT PROFILE ADULT - FALL HARM RISK - UNIVERSAL INTERVENTIONS
Bed in lowest position, wheels locked, appropriate side rails in place/Call bell, personal items and telephone in reach/Instruct patient to call for assistance before getting out of bed or chair/Non-slip footwear when patient is out of bed/[place] to call system/Physically safe environment - no spills, clutter or unnecessary equipment/Purposeful Proactive Rounding/Room/bathroom lighting operational, light cord in reach

## 2023-05-19 NOTE — DISCHARGE NOTE PROVIDER - NSDCCPCAREPLAN_GEN_ALL_CORE_FT
PRINCIPAL DISCHARGE DIAGNOSIS  Diagnosis: Colitis  Assessment and Plan of Treatment:       SECONDARY DISCHARGE DIAGNOSES  Diagnosis: Breast nodule  Assessment and Plan of Treatment: you will need to follow up w/ your PCP for further management     PRINCIPAL DISCHARGE DIAGNOSIS  Diagnosis: Colitis  Assessment and Plan of Treatment: You were monitored after receiving a short course of antibiotics in the hospital.  Follow up with your primary care physician for further monitoring in 1-2 weeks. Please call to arrange appointment.        SECONDARY DISCHARGE DIAGNOSES  Diagnosis: Breast nodule  Assessment and Plan of Treatment: you will need to follow up w/ your PCP for further management     PRINCIPAL DISCHARGE DIAGNOSIS  Diagnosis: Colitis  Assessment and Plan of Treatment: You were monitored after receiving a short course of antibiotics in the hospital.  Follow up with your primary care physician for further monitoring in 1-2 weeks. Please call to arrange appointment.        SECONDARY DISCHARGE DIAGNOSES  Diagnosis: Pancreatic cyst  Assessment and Plan of Treatment: you will need to follow up w/ your PCP for further management    Diagnosis: Breast nodule  Assessment and Plan of Treatment: you will need to follow up w/ your PCP for further management    Diagnosis: Dizziness  Assessment and Plan of Treatment: No cause for your dizziness was identified.  Please follow up at a physical therapy office for vestibular rehab, which might help manage dizziness.

## 2023-05-19 NOTE — PROGRESS NOTE ADULT - TIME BILLING
Time spent includes direct patient care  (interview and examination of patient), discussion with other providers, support staff and/or patient's family members, review of medical records, ordering diagnostic tests and analyzing results, and documentation.

## 2023-05-19 NOTE — DISCHARGE NOTE PROVIDER - PROVIDER TOKENS
FREE:[LAST:[your primary care provider],PHONE:[(   )    -],FAX:[(   )    -],ADDRESS:[follow up in 1 week with your primary care provider]] FREE:[LAST:[your primary care provider],PHONE:[(   )    -],FAX:[(   )    -],ADDRESS:[follow up in 1 week with your primary care provider]],FREE:[LAST:[PCP],PHONE:[(   )    -],FAX:[(   )    -],ADDRESS:[follow up with Dr Lindsay call for appointment]]

## 2023-05-19 NOTE — PATIENT PROFILE ADULT - FALL HARM RISK - FALLEN IN PAST
Patient identified with 2 identifiers (name and ). Spoke with patient daughter Brian Krishnan and she states she has not heard any thing from [address]. New referral has been placed. Spoke with [facility] and they are going top reach out to Brian Krishnan patients daughter. No

## 2023-05-19 NOTE — DISCHARGE NOTE PROVIDER - CARE PROVIDER_API CALL
your primary care provider,   follow up in 1 week with your primary care provider  Phone: (   )    -  Fax: (   )    -  Follow Up Time:    your primary care provider,   follow up in 1 week with your primary care provider  Phone: (   )    -  Fax: (   )    -  Follow Up Time:     PCP,   follow up with Dr Lindsay call for appointment  Phone: (   )    -  Fax: (   )    -  Follow Up Time:

## 2023-05-19 NOTE — DISCHARGE NOTE PROVIDER - NSDCMRMEDTOKEN_GEN_ALL_CORE_FT
ALBUTEROL HFA INH(200 PUFFS)18GM: INHALE 1 PUFF BY MOUTH EVERY 4 TO 6 HOURS AS NEEDED  aspirin 81 mg oral tablet: 1 tab(s) orally once a day  CARVEDILOL 6.25MG TABLETS: TAKE 1 TABLET BY MOUTH TWICE DAILY  HYDROCHLOROTHIAZIDE 25MGTABLETS: TAKE 1 TABLET BY MOUTH EVERY DAY IN THE MORNING  LUBIPROSTONE 24MCG CAPSULES: 1 cap(s) orally once a day  MECLIZINE 25MG RX TABLETS: TAKE 1 TABLET BY MOUTH THREE TIMES DAILY AS NEEDED  METFORMIN 500MG TABLETS: TAKE 1 TABLET BY MOUTH DAILY WITH DINNER   ALBUTEROL HFA INH(200 PUFFS)18GM: INHALE 1 PUFF BY MOUTH EVERY 4 TO 6 HOURS AS NEEDED  aspirin 81 mg oral tablet: 1 tab(s) orally once a day  CARVEDILOL 6.25MG TABLETS: TAKE 1 TABLET BY MOUTH TWICE DAILY  HYDROCHLOROTHIAZIDE 25MGTABLETS: TAKE 1 TABLET BY MOUTH EVERY DAY IN THE MORNING  LUBIPROSTONE 24MCG CAPSULES: 1 cap(s) orally once a day  MECLIZINE 25MG RX TABLETS: TAKE 1 TABLET BY MOUTH THREE TIMES DAILY AS NEEDED  METFORMIN 500MG TABLETS: TAKE 1 TABLET BY MOUTH DAILY WITH DINNER  Vestibular Rehabilitation: please evaluate

## 2023-05-19 NOTE — DISCHARGE NOTE PROVIDER - ATTENDING DISCHARGE PHYSICAL EXAMINATION:
GENERAL: NAD  EYES: EOMI, conjunctiva and sclera clear  NECK: Supple, No JVD  CHEST/LUNG: Clear to auscultation bilaterally; No wheeze  HEART: Regular rate and rhythm; No murmurs, rubs, or gallops  ABDOMEN: Soft, Nontender, Nondistended; Bowel sounds present  EXTREMITIES:  2+ Peripheral Pulses, No clubbing, cyanosis, or edema  NEUROLOGY: non-focal  SKIN: No rashes or lesions

## 2023-05-19 NOTE — DISCHARGE NOTE PROVIDER - HOSPITAL COURSE
89-y/o F asthma, breast cancer s/p excision/RT, HTN, presenting from home with an episode of presyncope, also found to have colitis.    Colitis-descending/rectosigmoid colitis, s/p pip/tazo in ED, will monitor off abx for now  check stool studies  monitor stool counts  reports constipation x2d then took Miralax with 2 large formed BM followed by diarrhea  advance diet as tolerated.    Postural dizziness with presyncope.  no LOC  check orthostatics, pending findings can check TTE (reportedly had one recently performed in [place] 2mo ago)  monitor on [employer] 25->21, no chest pain.     Breast nodule-outpt f/u with PMD (patient to schedule).    Pancreatic cyst-outpt f/u with PMD for further w/u.   89-y/o F asthma, breast cancer s/p excision/RT, HTN, presenting from home with an episode of presyncope, also found to have colitis.  Diarrhea resolved.  Patient was not orthostatic.  PT eval : No needs. She has had recurring dizziness and had an inpatient workup recently in [place] with no etiology identified.  Diarrhea resolved and patient tolerated regular diet.       Breast nodule-outpt f/u with PMD (patient to schedule).    Pancreatic cyst-outpt f/u with PMD for further w/u.   89-y/o F h/o asthma, breast cancer s/p excision/RT, HTN, presenting from home with an episode of presyncope, also found to have colitis.  Diarrhea resolved, tolerating diet. Monitored off antibiotics and remained stable. Dizziness upon standing/presyncope, no LOC. Patient was not orthostatic. PT eval : No needs. She has had recurring dizziness and had an inpatient workup recently in [place] with no etiology identified.     Breast nodule-outpt f/u with PMD (patient to schedule).    Pancreatic cyst-outpt f/u with PMD for further w/u.

## 2023-05-19 NOTE — PHYSICAL THERAPY INITIAL EVALUATION ADULT - PERTINENT HX OF CURRENT PROBLEM, REHAB EVAL
Patient presented with a presyncopal episode when she initially stood up at home yesterday; also found to have colitis

## 2023-05-20 LAB
ANION GAP SERPL CALC-SCNC: 10 MMOL/L — SIGNIFICANT CHANGE UP (ref 7–14)
BUN SERPL-MCNC: 15 MG/DL — SIGNIFICANT CHANGE UP (ref 7–23)
CALCIUM SERPL-MCNC: 9.3 MG/DL — SIGNIFICANT CHANGE UP (ref 8.4–10.5)
CHLORIDE SERPL-SCNC: 98 MMOL/L — SIGNIFICANT CHANGE UP (ref 98–107)
CO2 SERPL-SCNC: 26 MMOL/L — SIGNIFICANT CHANGE UP (ref 22–31)
CREAT SERPL-MCNC: 0.77 MG/DL — SIGNIFICANT CHANGE UP (ref 0.5–1.3)
EGFR: 72 ML/MIN/1.73M2 — SIGNIFICANT CHANGE UP
GLUCOSE SERPL-MCNC: 119 MG/DL — HIGH (ref 70–99)
MAGNESIUM SERPL-MCNC: 1.8 MG/DL — SIGNIFICANT CHANGE UP (ref 1.6–2.6)
PHOSPHATE SERPL-MCNC: 3.5 MG/DL — SIGNIFICANT CHANGE UP (ref 2.5–4.5)
POTASSIUM SERPL-MCNC: 3.6 MMOL/L — SIGNIFICANT CHANGE UP (ref 3.5–5.3)
POTASSIUM SERPL-SCNC: 3.6 MMOL/L — SIGNIFICANT CHANGE UP (ref 3.5–5.3)
SODIUM SERPL-SCNC: 134 MMOL/L — LOW (ref 135–145)

## 2023-05-20 PROCEDURE — 99232 SBSQ HOSP IP/OBS MODERATE 35: CPT

## 2023-05-20 RX ORDER — POTASSIUM CHLORIDE 20 MEQ
20 PACKET (EA) ORAL ONCE
Refills: 0 | Status: COMPLETED | OUTPATIENT
Start: 2023-05-20 | End: 2023-05-20

## 2023-05-20 RX ADMIN — CARVEDILOL PHOSPHATE 6.25 MILLIGRAM(S): 80 CAPSULE, EXTENDED RELEASE ORAL at 17:37

## 2023-05-20 RX ADMIN — HEPARIN SODIUM 5000 UNIT(S): 5000 INJECTION INTRAVENOUS; SUBCUTANEOUS at 05:54

## 2023-05-20 RX ADMIN — CARVEDILOL PHOSPHATE 6.25 MILLIGRAM(S): 80 CAPSULE, EXTENDED RELEASE ORAL at 05:55

## 2023-05-20 RX ADMIN — Medication 81 MILLIGRAM(S): at 09:01

## 2023-05-20 RX ADMIN — Medication 20 MILLIEQUIVALENT(S): at 09:06

## 2023-05-20 NOTE — DISCHARGE NOTE NURSING/CASE MANAGEMENT/SOCIAL WORK - PATIENT PORTAL LINK FT
You can access the FollowMyHealth Patient Portal offered by [facility] by registering at the following website: [email or web address]. By joining [employer]’s FollowMyHealth portal, you will also be able to view your health information using other applications (apps) compatible with our system.

## 2023-05-21 LAB
ANION GAP SERPL CALC-SCNC: 12 MMOL/L — SIGNIFICANT CHANGE UP (ref 7–14)
BUN SERPL-MCNC: 13 MG/DL — SIGNIFICANT CHANGE UP (ref 7–23)
CALCIUM SERPL-MCNC: 9.2 MG/DL — SIGNIFICANT CHANGE UP (ref 8.4–10.5)
CHLORIDE SERPL-SCNC: 99 MMOL/L — SIGNIFICANT CHANGE UP (ref 98–107)
CO2 SERPL-SCNC: 27 MMOL/L — SIGNIFICANT CHANGE UP (ref 22–31)
CREAT SERPL-MCNC: 0.69 MG/DL — SIGNIFICANT CHANGE UP (ref 0.5–1.3)
EGFR: 81 ML/MIN/1.73M2 — SIGNIFICANT CHANGE UP
GLUCOSE SERPL-MCNC: 105 MG/DL — HIGH (ref 70–99)
MAGNESIUM SERPL-MCNC: 1.8 MG/DL — SIGNIFICANT CHANGE UP (ref 1.6–2.6)
PHOSPHATE SERPL-MCNC: 3.6 MG/DL — SIGNIFICANT CHANGE UP (ref 2.5–4.5)
POTASSIUM SERPL-MCNC: 3.3 MMOL/L — LOW (ref 3.5–5.3)
POTASSIUM SERPL-SCNC: 3.3 MMOL/L — LOW (ref 3.5–5.3)
SODIUM SERPL-SCNC: 138 MMOL/L — SIGNIFICANT CHANGE UP (ref 135–145)

## 2023-05-21 PROCEDURE — 99232 SBSQ HOSP IP/OBS MODERATE 35: CPT

## 2023-05-21 RX ORDER — POTASSIUM CHLORIDE 20 MEQ
40 PACKET (EA) ORAL ONCE
Refills: 0 | Status: COMPLETED | OUTPATIENT
Start: 2023-05-21 | End: 2023-05-21

## 2023-05-21 RX ADMIN — Medication 81 MILLIGRAM(S): at 17:12

## 2023-05-21 RX ADMIN — CARVEDILOL PHOSPHATE 6.25 MILLIGRAM(S): 80 CAPSULE, EXTENDED RELEASE ORAL at 05:15

## 2023-05-21 RX ADMIN — Medication 40 MILLIEQUIVALENT(S): at 09:33

## 2023-05-21 RX ADMIN — HEPARIN SODIUM 5000 UNIT(S): 5000 INJECTION INTRAVENOUS; SUBCUTANEOUS at 17:12

## 2023-05-21 RX ADMIN — CARVEDILOL PHOSPHATE 6.25 MILLIGRAM(S): 80 CAPSULE, EXTENDED RELEASE ORAL at 17:12

## 2023-05-21 RX ADMIN — HEPARIN SODIUM 5000 UNIT(S): 5000 INJECTION INTRAVENOUS; SUBCUTANEOUS at 05:16

## 2023-05-22 VITALS
HEART RATE: 87 BPM | OXYGEN SATURATION: 98 % | SYSTOLIC BLOOD PRESSURE: 150 MMHG | TEMPERATURE: 98 F | RESPIRATION RATE: 17 BRPM | DIASTOLIC BLOOD PRESSURE: 86 MMHG

## 2023-05-22 LAB
ANION GAP SERPL CALC-SCNC: 13 MMOL/L — SIGNIFICANT CHANGE UP (ref 7–14)
BUN SERPL-MCNC: 19 MG/DL — SIGNIFICANT CHANGE UP (ref 7–23)
CALCIUM SERPL-MCNC: 9.2 MG/DL — SIGNIFICANT CHANGE UP (ref 8.4–10.5)
CHLORIDE SERPL-SCNC: 101 MMOL/L — SIGNIFICANT CHANGE UP (ref 98–107)
CO2 SERPL-SCNC: 24 MMOL/L — SIGNIFICANT CHANGE UP (ref 22–31)
CREAT SERPL-MCNC: 0.71 MG/DL — SIGNIFICANT CHANGE UP (ref 0.5–1.3)
EGFR: 80 ML/MIN/1.73M2 — SIGNIFICANT CHANGE UP
GLUCOSE SERPL-MCNC: 138 MG/DL — HIGH (ref 70–99)
MAGNESIUM SERPL-MCNC: 1.9 MG/DL — SIGNIFICANT CHANGE UP (ref 1.6–2.6)
PHOSPHATE SERPL-MCNC: 4 MG/DL — SIGNIFICANT CHANGE UP (ref 2.5–4.5)
POTASSIUM SERPL-MCNC: 4.9 MMOL/L — SIGNIFICANT CHANGE UP (ref 3.5–5.3)
POTASSIUM SERPL-SCNC: 4.9 MMOL/L — SIGNIFICANT CHANGE UP (ref 3.5–5.3)
SODIUM SERPL-SCNC: 138 MMOL/L — SIGNIFICANT CHANGE UP (ref 135–145)

## 2023-05-22 PROCEDURE — 99239 HOSP IP/OBS DSCHRG MGMT >30: CPT

## 2023-05-22 RX ADMIN — CARVEDILOL PHOSPHATE 6.25 MILLIGRAM(S): 80 CAPSULE, EXTENDED RELEASE ORAL at 17:20

## 2023-05-22 RX ADMIN — HEPARIN SODIUM 5000 UNIT(S): 5000 INJECTION INTRAVENOUS; SUBCUTANEOUS at 17:20

## 2023-05-22 RX ADMIN — Medication 81 MILLIGRAM(S): at 17:20

## 2023-05-22 RX ADMIN — CARVEDILOL PHOSPHATE 6.25 MILLIGRAM(S): 80 CAPSULE, EXTENDED RELEASE ORAL at 05:31

## 2023-05-22 RX ADMIN — HEPARIN SODIUM 5000 UNIT(S): 5000 INJECTION INTRAVENOUS; SUBCUTANEOUS at 05:31

## 2023-05-22 NOTE — PROGRESS NOTE ADULT - PROBLEM SELECTOR PLAN 5
HSQ for DVT Ppx  PT eval    CT showing possible cystitis, patient denies urinary symptoms, was recently treated for UTI a few weeks ago s/p 7d course of nitrofurantoin, UA Pending
HSQ for DVT Ppx  PT eval--no needs    DC home, d/c time 34 minutes
HSQ for DVT Ppx  PT eval--no needs
HSQ for DVT Ppx  PT eval--no needs

## 2023-05-22 NOTE — PROGRESS NOTE ADULT - PROBLEM SELECTOR PLAN 3
outpt f/u with PMD (patient to schedule)

## 2023-05-22 NOTE — PROGRESS NOTE ADULT - PROBLEM SELECTOR PLAN 2
patient reporting dizziness upon standing/presyncope, no LOC  not orthostatic  trop 25->21, no chest pain  no etiology found on recent admission in [place]  stable for d/c with meclizine and rx for vestibular rehab
patient reporting dizziness upon standing/presyncope, no LOC  check orthostatics, pending findings can check TTE (reportedly had one recently performed in [place] 2mo ago)  monitor on tele  [employer] 25->21, no chest pain
patient reporting dizziness upon standing/presyncope, no LOC  not orthostatic  trop 25->21, no chest pain  no etiology found on recent admission in [place]  stable for d/c with meclizine and rx for vestibular rehab
patient reporting dizziness upon standing/presyncope, no LOC  not orthostatic  trop 25->21, no chest pain  no etiology found on recent admission in [place]  stable for d/c with meclizine and rx for vestibular rehab

## 2023-05-22 NOTE — PROGRESS NOTE ADULT - PROBLEM SELECTOR PLAN 1
descending/rectosigmoid colitis  tachycardic on presentation, resolved  s/p pip/tazo in ED, will monitor off abx for now  check stool studies  monitor stool counts  reports constipation x2d then took Miralax with 2 large formed BM followed by diarrhea  advance diet as tolerated
descending/rectosigmoid colitis  tachycardic on presentation, resolved  s/p pip/tazo in ED, monitored off abx and remained stable   reports constipation x2d then took Miralax with 2 large formed BM followed by diarrhea  advance diet as tolerated  sx resolved, tolerating diet
descending/rectosigmoid colitis  tachycardic on presentation, resolved  s/p pip/tazo in ED, will monitor off abx for now  reports constipation x2d then took Miralax with 2 large formed BM followed by diarrhea  advance diet as tolerated  sx resolved tolerating diet
descending/rectosigmoid colitis  tachycardic on presentation, resolved  s/p pip/tazo in ED, will monitor off abx for now  reports constipation x2d then took Miralax with 2 large formed BM followed by diarrhea  advance diet as tolerated  sx resolved tolerating diet

## 2023-05-22 NOTE — PROGRESS NOTE ADULT - ASSESSMENT
89-year-old female with asthma, breast cancer s/p excision/RT, HTN, presenting from home with an episode of presyncope, also found to have colitis.
75yFemale with history of osteoarthritis presenting for right total hip arthroplasty by Dr. yash Claros on 11/21/18. Risk and benefits of surgery were explained to the patient. The patient understood and agreed to proceed with surgery. Patient underwent the procedure with no intraoperative complications. Pt was brought in stable condition to the PACU. Once stable in PACU, pt was brought to the floor. During hospital stay pt was followed by Medicine, [social work or home care] during this admission. Pt had an uneventful hospital course. Pt is stable for discharge to [rehab/home] on POD#[ ]

## 2023-05-22 NOTE — PROGRESS NOTE ADULT - PROBLEM SELECTOR PROBLEM 2
Postural dizziness with presyncope

## 2023-05-22 NOTE — PROGRESS NOTE ADULT - PROBLEM SELECTOR PLAN 4
outpt f/u with PMD for further w/u

## 2023-05-22 NOTE — PROGRESS NOTE ADULT - SUBJECTIVE AND OBJECTIVE BOX
[facility] Division of Hospital Medicine  Imer Torre MD  Pager [phone]    Patient is a 89y old  Female who presents with a chief complaint of presyncope (20 May 2023 09:44)      SUBJECTIVE / OVERNIGHT EVENTS: no events      MEDICATIONS  (STANDING):  aspirin enteric coated 81 milliGRAM(s) Oral daily  carvedilol 6.25 milliGRAM(s) Oral every 12 hours  heparin   Injectable 5000 Unit(s) SubCutaneous every 12 hours  hydrochlorothiazide 25 milliGRAM(s) Oral daily    MEDICATIONS  (PRN):  meclizine 25 milliGRAM(s) Oral every 8 hours PRN Dizziness      CAPILLARY BLOOD GLUCOSE        I&O's Summary    20 May 2023 07:01  -  21 May 2023 07:00  --------------------------------------------------------  IN: 0 mL / OUT: 1 mL / NET: -1 mL        PHYSICAL EXAM:  Vital Signs Last 24 Hrs  T(C): 36.6 (21 May 2023 11:56), Max: 36.7 (20 May 2023 14:52)  T(F): 97.9 (21 May 2023 11:56), Max: 98 (20 May 2023 14:52)  HR: 65 (21 May 2023 11:56) (62 - 78)  BP: 130/60 (21 May 2023 11:56) (122/67 - 147/62)  BP(mean): --  RR: 17 (21 May 2023 11:56) (16 - 17)  SpO2: 99% (21 May 2023 11:56) (99% - 100%)    Parameters below as of 21 May 2023 11:56  Patient On (Oxygen Delivery Method): room air      CONSTITUTIONAL: NAD  EYES: EOMI, conjunctiva and sclera clear  ENMT: Moist oral mucosa  NECK: Supple  RESPIRATORY: Breathing unlabored, CTAB  CARDIOVASCULAR: S1S2 no MRG  ABDOMEN: Nontender to palpation, normoactive bowel sounds, no rebound/guarding  MUSCULOSKELETAL: no clubbing or cyanosis of digits  NEUROLOGY: No focal deficits   SKIN: No rashes or lesions    LABS:    05-21    138  |  99  |  13  ----------------------------<  105<H>  3.3<L>   |  27  |  0.69    Ca    9.2      21 May 2023 07:00  Phos  3.6     05-21  Mg     1.80     05-21        CODE: FULL  
PROGRESS NOTE:     Patient is a 89y old  Female who presents with a chief complaint of presyncope (21 May 2023 13:05)      SUBJECTIVE / OVERNIGHT EVENTS: No new complaints.     ADDITIONAL REVIEW OF SYSTEMS:    MEDICATIONS  (STANDING):  aspirin enteric coated 81 milliGRAM(s) Oral daily  carvedilol 6.25 milliGRAM(s) Oral every 12 hours  heparin   Injectable 5000 Unit(s) SubCutaneous every 12 hours  hydrochlorothiazide 25 milliGRAM(s) Oral daily    MEDICATIONS  (PRN):  meclizine 25 milliGRAM(s) Oral every 8 hours PRN Dizziness      CAPILLARY BLOOD GLUCOSE        I&O's Summary    21 May 2023 07:01  -  22 May 2023 07:00  --------------------------------------------------------  IN: 0 mL / OUT: 1 mL / NET: -1 mL        PHYSICAL EXAM:  Vital Signs Last 24 Hrs  T(C): 36.9 (22 May 2023 10:24), Max: 37.1 (21 May 2023 19:30)  T(F): 98.4 (22 May 2023 10:24), Max: 98.7 (21 May 2023 19:30)  HR: 77 (22 May 2023 10:24) (76 - 90)  BP: 135/60 (22 May 2023 10:24) (124/60 - 150/79)  BP(mean): --  RR: 18 (22 May 2023 10:24) (17 - 18)  SpO2: 96% (22 May 2023 10:24) (96% - 97%)    Parameters below as of 22 May 2023 10:24  Patient On (Oxygen Delivery Method): room air      CONSTITUTIONAL: NAD  EYES: EOMI, conjunctiva and sclera clear  ENMT: Moist oral mucosa  NECK: Supple  RESPIRATORY: Breathing unlabored, CTAB  CARDIOVASCULAR: S1S2 no MRG  ABDOMEN: Nontender to palpation, normoactive bowel sounds, no rebound/guarding  MUSCULOSKELETAL: no clubbing or cyanosis of digits  NEUROLOGY: No focal deficits   SKIN: No rashes or lesions      LABS:    05-22    138  |  101  |  19  ----------------------------<  138<H>  4.9   |  24  |  0.71    Ca    9.2      22 May 2023 07:42  Phos  4.0     05-22  Mg     1.90     05-22                  RADIOLOGY & ADDITIONAL TESTS:  Results Reviewed:   Imaging Personally Reviewed:  Electrocardiogram Personally Reviewed:    COORDINATION OF CARE:  Care Discussed with Consultants/Other Providers [Y/N]:  Prior or Outpatient Records Reviewed [Y/N]:  
[facility] Division of Hospital Medicine  Imer Torre MD  Pager [phone]    Patient is a 89y old  Female who presents with a chief complaint of presyncope (19 May 2023 17:50)      SUBJECTIVE / OVERNIGHT EVENTS:  Continues to have dizziness when standing      MEDICATIONS  (STANDING):  aspirin enteric coated 81 milliGRAM(s) Oral daily  carvedilol 6.25 milliGRAM(s) Oral every 12 hours  heparin   Injectable 5000 Unit(s) SubCutaneous every 12 hours  hydrochlorothiazide 25 milliGRAM(s) Oral daily    MEDICATIONS  (PRN):  meclizine 25 milliGRAM(s) Oral every 8 hours PRN Dizziness      CAPILLARY BLOOD GLUCOSE        I&O's Summary    20 May 2023 07:01  -  21 May 2023 07:00  --------------------------------------------------------  IN: 0 mL / OUT: 1 mL / NET: -1 mL        PHYSICAL EXAM:  Vital Signs Last 24 Hrs  T(C): 36.5 (21 May 2023 05:10), Max: 36.7 (20 May 2023 14:52)  T(F): 97.7 (21 May 2023 05:10), Max: 98 (20 May 2023 14:52)  HR: 66 (21 May 2023 05:10) (62 - 78)  BP: 131/71 (21 May 2023 05:10) (122/67 - 147/62)  BP(mean): --  RR: 17 (21 May 2023 05:10) (16 - 18)  SpO2: 100% (21 May 2023 05:10) (100% - 100%)    Parameters below as of 21 May 2023 05:10  Patient On (Oxygen Delivery Method): room air      CONSTITUTIONAL: NAD  EYES: EOMI, conjunctiva and sclera clear  ENMT: Moist oral mucosa  NECK: Supple  RESPIRATORY: Breathing unlabored, CTAB  CARDIOVASCULAR: S1S2 no MRG  ABDOMEN: Nontender to palpation, normoactive bowel sounds, no rebound/guarding  MUSCULOSKELETAL: no clubbing or cyanosis of digits  NEUROLOGY: No focal deficits   SKIN: No rashes or lesions    LABS:    05-21    138  |  99  |  13  ----------------------------<  105<H>  3.3<L>   |  27  |  0.69    Ca    9.2      21 May 2023 07:00  Phos  3.6     05-21  Mg     1.80     05-21        CODE: FULL  
[facility] Division of Hospital Medicine  Imer Torre MD  Pager [phone]    Patient is a 89y old  Female who presents with a chief complaint of presyncope (18 May 2023 22:59)      SUBJECTIVE / OVERNIGHT EVENTS: Toletrated clears, has appetite      MEDICATIONS  (STANDING):  aspirin enteric coated 81 milliGRAM(s) Oral daily  carvedilol 6.25 milliGRAM(s) Oral every 12 hours  heparin   Injectable 5000 Unit(s) SubCutaneous every 12 hours  hydrochlorothiazide 25 milliGRAM(s) Oral daily    MEDICATIONS  (PRN):  meclizine 25 milliGRAM(s) Oral every 8 hours PRN Dizziness      CAPILLARY BLOOD GLUCOSE        I&O's Summary      PHYSICAL EXAM:  Vital Signs Last 24 Hrs  T(C): 36.7 (19 May 2023 09:10), Max: 37.2 (18 May 2023 18:03)  T(F): 98.1 (19 May 2023 09:10), Max: 98.9 (18 May 2023 18:03)  HR: 70 (19 May 2023 09:10) (66 - 77)  BP: 124/77 (19 May 2023 09:10) (124/77 - 166/72)  BP(mean): --  RR: 18 (19 May 2023 09:10) (16 - 20)  SpO2: 98% (19 May 2023 09:10) (96% - 98%)    Parameters below as of 19 May 2023 09:10  Patient On (Oxygen Delivery Method): room air      CONSTITUTIONAL: NAD  EYES: EOMI, conjunctiva and sclera clear  ENMT: Moist oral mucosa  NECK: Supple  RESPIRATORY: Breathing unlabored, CTAB  CARDIOVASCULAR: S1S2 no MRG  ABDOMEN: Nontender to palpation, normoactive bowel sounds, no rebound/guarding  MUSCULOSKELETAL: no clubbing or cyanosis of digits  NEUROLOGY: No focal deficits   SKIN: No rashes or lesions    LABS:                        13.1   5.93  )-----------( 202      ( 19 May 2023 06:34 )             39.9     05-19    137  |  97<L>  |  14  ----------------------------<  90  3.2<L>   |  27  |  0.87    Ca    9.4      19 May 2023 06:34  Phos  3.2     05-19  Mg     1.70     05-19    TPro  7.9  /  Alb  4.6  /  TBili  0.5  /  DBili  x   /  AST  20  /  ALT  11  /  AlkPhos  85  05-18          CODE: FULL

## 2023-10-09 ENCOUNTER — INPATIENT (INPATIENT)
Facility: HOSPITAL | Age: 88
LOS: 3 days | Discharge: HOME HEALTH | End: 2023-10-13
Attending: STUDENT IN AN ORGANIZED HEALTH CARE EDUCATION/TRAINING PROGRAM | Admitting: STUDENT IN AN ORGANIZED HEALTH CARE EDUCATION/TRAINING PROGRAM
Payer: MEDICARE

## 2023-10-09 VITALS
OXYGEN SATURATION: 100 % | SYSTOLIC BLOOD PRESSURE: 224 MMHG | TEMPERATURE: 98 F | DIASTOLIC BLOOD PRESSURE: 122 MMHG | RESPIRATION RATE: 16 BRPM | HEART RATE: 86 BPM

## 2023-10-09 DIAGNOSIS — Z98.890 OTHER SPECIFIED POSTPROCEDURAL STATES: Chronic | ICD-10-CM

## 2023-10-09 PROCEDURE — 71045 X-RAY EXAM CHEST 1 VIEW: CPT | Mod: 26

## 2023-10-09 PROCEDURE — 99285 EMERGENCY DEPT VISIT HI MDM: CPT | Mod: GC

## 2023-10-09 NOTE — ED ADULT TRIAGE NOTE - CHIEF COMPLAINT QUOTE
Pt ambulatory to triage with assistance c/o dizziness since 5 PM today. Equal strong strength to BUE, no facial droop or slurred speech. Pt at baseline mental status as per niece. Denies recent falls. Pt hypertensive in triage. Unsure if patient took meds today. MD Bailey called for stroke eval. No code stroke called. PHx HTN, DM2, asthma

## 2023-10-09 NOTE — ED ADULT NURSE NOTE - OBJECTIVE STATEMENT
pt received in room 6, a&ox4 with infrequent periods of confusion but easily re-directed, ambulatory at baseline, presents to the ED after episode of dizziness at 5pm that has since subsided. pt states that during episode she also felt her speech not being clear. currently pt dizziness, ha, weakness, n/v, changes in vision, chest pain, sob, numbness/tingling. per niece at bedside patient is at baseline mental status. +perrla. +/= pulses, strength, movement, and sensation in all extremities. speech clear and comprehensible. no facial droop noted. pt hypertensive at present, pt unsure if she took BP meds today. NAD noted at this time. respirations are even and nonlabored on room air. MD at bedside, awaiting orders.

## 2023-10-09 NOTE — ED ADULT NURSE NOTE - CHIEF COMPLAINT QUOTE
Chaudhary Lamine presents today for   Chief Complaint   Patient presents with    Follow-up    Diabetes    Hyperthyroidism       Is someone accompanying this pt? no    Is the patient using any DME equipment during OV? no    Depression Screening:  3 most recent PHQ Screens 1/16/2023   Little interest or pleasure in doing things Not at all   Feeling down, depressed, irritable, or hopeless Not at all   Total Score PHQ 2 0       Learning Assessment:  Learning Assessment 3/18/2022   PRIMARY LEARNER Patient   HIGHEST LEVEL OF EDUCATION - PRIMARY LEARNER  GRADUATED HIGH SCHOOL OR GED   BARRIERS PRIMARY LEARNER [address] [facility] -   [address] NAME -   PRIMARY LANGUAGE ENGLISH   LEARNER PREFERENCE PRIMARY LISTENING     -   ANSWERED BY patient    RELATIONSHIP SELF       Abuse Screening:  Abuse Screening Questionnaire 4/21/2022   Do you ever feel afraid of your partner? N   Are you in a relationship with someone who physically or mentally threatens you? N   Is it safe for you to go home? Y       Fall Risk  Fall Risk Assessment, last 12 mths 8/4/2021   Able to walk? Yes   Fall in past 12 months? 1   Do you feel unsteady? 0   Are you worried about falling 0   Is TUG test greater than 12 seconds? 0   Is the gait abnormal? 0   Number of falls in past 12 months 1   Fall with injury? 1       Health Maintenance reviewed and discussed and ordered per Provider. Health Maintenance Due   Topic Date Due    Hepatitis B Vaccine (1 of 3 - Risk 3-dose series) Never done    Low dose CT lung screening  Never done    Foot Exam Q1  01/31/2021    Colorectal Cancer Screening Combo  11/22/2022    COVID-19 Vaccine (5 - Booster for Pfizer series) 12/15/2022   .        1. \"Have you been to the ER, urgent care clinic since your last visit? Hospitalized since your last visit? \" No    2. \"Have you seen or consulted any other health care providers outside of the [address] [place] [address] since your last visit? \" Yes neurology     3.  For patients over 39: Has the patient had a colonoscopy? No     If the patient is female:    4. For patients over 40: Has the patient had a mammogram? Yes - no Care Gap present    5. For patients over 21: Has the patient had a pap smear?  NA - based on age Pt ambulatory to triage with assistance c/o dizziness since 5 PM today. Equal strong strength to BUE, no facial droop or slurred speech. Pt at baseline mental status as per niece. Denies recent falls. Pt hypertensive in triage. Unsure if patient took meds today. MD Bailey called for stroke eval. No code stroke called. PHx HTN, DM2, asthma

## 2023-10-10 DIAGNOSIS — Z29.9 ENCOUNTER FOR PROPHYLACTIC MEASURES, UNSPECIFIED: ICD-10-CM

## 2023-10-10 DIAGNOSIS — I10 ESSENTIAL (PRIMARY) HYPERTENSION: ICD-10-CM

## 2023-10-10 DIAGNOSIS — R42 DIZZINESS AND GIDDINESS: ICD-10-CM
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
## 2023-10-10 DIAGNOSIS — E11.9 TYPE 2 DIABETES MELLITUS WITHOUT COMPLICATIONS: ICD-10-CM

## 2023-10-10 DIAGNOSIS — J45.909 UNSPECIFIED ASTHMA, UNCOMPLICATED: ICD-10-CM

## 2023-10-10 DIAGNOSIS — R93.89 ABNORMAL FINDINGS ON DIAGNOSTIC IMAGING OF OTHER SPECIFIED BODY STRUCTURES: ICD-10-CM

## 2023-10-10 LAB
ALBUMIN SERPL ELPH-MCNC: 4.4 G/DL — SIGNIFICANT CHANGE UP (ref 3.3–5)
ALP SERPL-CCNC: 67 U/L — SIGNIFICANT CHANGE UP (ref 40–120)
ALT FLD-CCNC: 10 U/L — SIGNIFICANT CHANGE UP (ref 4–33)
ANION GAP SERPL CALC-SCNC: 11 MMOL/L — SIGNIFICANT CHANGE UP (ref 7–14)
APPEARANCE UR: CLEAR — SIGNIFICANT CHANGE UP
APTT BLD: 36.4 SEC — HIGH (ref 24.5–35.6)
AST SERPL-CCNC: 23 U/L — SIGNIFICANT CHANGE UP (ref 4–32)
BACTERIA # UR AUTO: NEGATIVE /HPF — SIGNIFICANT CHANGE UP
BASE EXCESS BLDV CALC-SCNC: 8.6 MMOL/L — HIGH (ref -2–3)
BASOPHILS # BLD AUTO: 0.04 K/UL — SIGNIFICANT CHANGE UP (ref 0–0.2)
BASOPHILS NFR BLD AUTO: 0.8 % — SIGNIFICANT CHANGE UP (ref 0–2)
BILIRUB SERPL-MCNC: 0.4 MG/DL — SIGNIFICANT CHANGE UP (ref 0.2–1.2)
BILIRUB UR-MCNC: NEGATIVE — SIGNIFICANT CHANGE UP
BLOOD GAS VENOUS COMPREHENSIVE RESULT: SIGNIFICANT CHANGE UP
BUN SERPL-MCNC: 7 MG/DL — SIGNIFICANT CHANGE UP (ref 7–23)
CALCIUM SERPL-MCNC: 9.4 MG/DL — SIGNIFICANT CHANGE UP (ref 8.4–10.5)
CAST: 0 /LPF — SIGNIFICANT CHANGE UP (ref 0–4)
CHLORIDE BLDV-SCNC: 100 MMOL/L — SIGNIFICANT CHANGE UP (ref 96–108)
CHLORIDE SERPL-SCNC: 100 MMOL/L — SIGNIFICANT CHANGE UP (ref 98–107)
CO2 BLDV-SCNC: 38.3 MMOL/L — HIGH (ref 22–26)
CO2 SERPL-SCNC: 30 MMOL/L — SIGNIFICANT CHANGE UP (ref 22–31)
COLOR SPEC: YELLOW — SIGNIFICANT CHANGE UP
CREAT SERPL-MCNC: 0.64 MG/DL — SIGNIFICANT CHANGE UP (ref 0.5–1.3)
DIFF PNL FLD: ABNORMAL
EGFR: 82 ML/MIN/1.73M2 — SIGNIFICANT CHANGE UP
EOSINOPHIL # BLD AUTO: 0.18 K/UL — SIGNIFICANT CHANGE UP (ref 0–0.5)
EOSINOPHIL NFR BLD AUTO: 3.8 % — SIGNIFICANT CHANGE UP (ref 0–6)
GAS PNL BLDV: 137 MMOL/L — SIGNIFICANT CHANGE UP (ref 136–145)
GLUCOSE BLDC GLUCOMTR-MCNC: 193 MG/DL — HIGH (ref 70–99)
GLUCOSE BLDV-MCNC: 139 MG/DL — HIGH (ref 70–99)
GLUCOSE SERPL-MCNC: 137 MG/DL — HIGH (ref 70–99)
GLUCOSE UR QL: NEGATIVE MG/DL — SIGNIFICANT CHANGE UP
HCO3 BLDV-SCNC: 36 MMOL/L — HIGH (ref 22–29)
HCT VFR BLD CALC: 43.8 % — SIGNIFICANT CHANGE UP (ref 34.5–45)
HCT VFR BLDA CALC: 43 % — SIGNIFICANT CHANGE UP (ref 34.5–46.5)
HGB BLD CALC-MCNC: 14.4 G/DL — SIGNIFICANT CHANGE UP (ref 11.7–16.1)
HGB BLD-MCNC: 14 G/DL — SIGNIFICANT CHANGE UP (ref 11.5–15.5)
IANC: 2.29 K/UL — SIGNIFICANT CHANGE UP (ref 1.8–7.4)
IMM GRANULOCYTES NFR BLD AUTO: 0.2 % — SIGNIFICANT CHANGE UP (ref 0–0.9)
INR BLD: 0.98 RATIO — SIGNIFICANT CHANGE UP (ref 0.85–1.18)
KETONES UR-MCNC: NEGATIVE MG/DL — SIGNIFICANT CHANGE UP
LACTATE BLDV-MCNC: 1.7 MMOL/L — SIGNIFICANT CHANGE UP (ref 0.5–2)
LEUKOCYTE ESTERASE UR-ACNC: NEGATIVE — SIGNIFICANT CHANGE UP
LYMPHOCYTES # BLD AUTO: 1.85 K/UL — SIGNIFICANT CHANGE UP (ref 1–3.3)
LYMPHOCYTES # BLD AUTO: 38.8 % — SIGNIFICANT CHANGE UP (ref 13–44)
MCHC RBC-ENTMCNC: 26.9 PG — LOW (ref 27–34)
MCHC RBC-ENTMCNC: 32 GM/DL — SIGNIFICANT CHANGE UP (ref 32–36)
MCV RBC AUTO: 84.1 FL — SIGNIFICANT CHANGE UP (ref 80–100)
MONOCYTES # BLD AUTO: 0.4 K/UL — SIGNIFICANT CHANGE UP (ref 0–0.9)
MONOCYTES NFR BLD AUTO: 8.4 % — SIGNIFICANT CHANGE UP (ref 2–14)
NEUTROPHILS # BLD AUTO: 2.29 K/UL — SIGNIFICANT CHANGE UP (ref 1.8–7.4)
NEUTROPHILS NFR BLD AUTO: 48 % — SIGNIFICANT CHANGE UP (ref 43–77)
NITRITE UR-MCNC: NEGATIVE — SIGNIFICANT CHANGE UP
NRBC # BLD: 0 /100 WBCS — SIGNIFICANT CHANGE UP (ref 0–0)
NRBC # FLD: 0 K/UL — SIGNIFICANT CHANGE UP (ref 0–0)
PCO2 BLDV: 63 MMHG — HIGH (ref 39–52)
PH BLDV: 7.37 — SIGNIFICANT CHANGE UP (ref 7.32–7.43)
PH UR: 8 — SIGNIFICANT CHANGE UP (ref 5–8)
PLATELET # BLD AUTO: 182 K/UL — SIGNIFICANT CHANGE UP (ref 150–400)
PO2 BLDV: 25 MMHG — SIGNIFICANT CHANGE UP (ref 25–45)
POTASSIUM BLDV-SCNC: 3.6 MMOL/L — SIGNIFICANT CHANGE UP (ref 3.5–5.1)
POTASSIUM SERPL-MCNC: 3.5 MMOL/L — SIGNIFICANT CHANGE UP (ref 3.5–5.3)
POTASSIUM SERPL-SCNC: 3.5 MMOL/L — SIGNIFICANT CHANGE UP (ref 3.5–5.3)
PROT SERPL-MCNC: 7.3 G/DL — SIGNIFICANT CHANGE UP (ref 6–8.3)
PROT UR-MCNC: NEGATIVE MG/DL — SIGNIFICANT CHANGE UP
PROTHROM AB SERPL-ACNC: 11.1 SEC — SIGNIFICANT CHANGE UP (ref 9.5–13)
RBC # BLD: 5.21 M/UL — HIGH (ref 3.8–5.2)
RBC # FLD: 14.1 % — SIGNIFICANT CHANGE UP (ref 10.3–14.5)
RBC CASTS # UR COMP ASSIST: 2 /HPF — SIGNIFICANT CHANGE UP (ref 0–4)
SAO2 % BLDV: 34.9 % — LOW (ref 67–88)
SODIUM SERPL-SCNC: 141 MMOL/L — SIGNIFICANT CHANGE UP (ref 135–145)
SP GR SPEC: 1.01 — SIGNIFICANT CHANGE UP (ref 1–1.03)
SQUAMOUS # UR AUTO: 0 /HPF — SIGNIFICANT CHANGE UP (ref 0–5)
TROPONIN T, HIGH SENSITIVITY RESULT: 24 NG/L — SIGNIFICANT CHANGE UP
UROBILINOGEN FLD QL: 0.2 MG/DL — SIGNIFICANT CHANGE UP (ref 0.2–1)
WBC # BLD: 4.77 K/UL — SIGNIFICANT CHANGE UP (ref 3.8–10.5)
WBC # FLD AUTO: 4.77 K/UL — SIGNIFICANT CHANGE UP (ref 3.8–10.5)
WBC UR QL: 0 /HPF — SIGNIFICANT CHANGE UP (ref 0–5)

## 2023-10-10 PROCEDURE — 99223 1ST HOSP IP/OBS HIGH 75: CPT

## 2023-10-10 PROCEDURE — 70498 CT ANGIOGRAPHY NECK: CPT | Mod: 26,MA

## 2023-10-10 PROCEDURE — 99222 1ST HOSP IP/OBS MODERATE 55: CPT

## 2023-10-10 PROCEDURE — 70496 CT ANGIOGRAPHY HEAD: CPT | Mod: 26,MA

## 2023-10-10 PROCEDURE — 70450 CT HEAD/BRAIN W/O DYE: CPT | Mod: 26,MA,59

## 2023-10-10 RX ORDER — LUBIPROSTONE 24 UG/1
1 CAPSULE, GELATIN COATED ORAL
Qty: 0 | Refills: 0 | DISCHARGE

## 2023-10-10 RX ORDER — MECLIZINE HCL 12.5 MG
25 TABLET ORAL EVERY 6 HOURS
Refills: 0 | Status: DISCONTINUED | OUTPATIENT
Start: 2023-10-10 | End: 2023-10-13

## 2023-10-10 RX ORDER — ACETAMINOPHEN 500 MG
650 TABLET ORAL EVERY 6 HOURS
Refills: 0 | Status: DISCONTINUED | OUTPATIENT
Start: 2023-10-10 | End: 2023-10-13

## 2023-10-10 RX ORDER — DEXTROSE 50 % IN WATER 50 %
25 SYRINGE (ML) INTRAVENOUS ONCE
Refills: 0 | Status: DISCONTINUED | OUTPATIENT
Start: 2023-10-10 | End: 2023-10-13

## 2023-10-10 RX ORDER — GLUCAGON INJECTION, SOLUTION 0.5 MG/.1ML
1 INJECTION, SOLUTION SUBCUTANEOUS ONCE
Refills: 0 | Status: DISCONTINUED | OUTPATIENT
Start: 2023-10-10 | End: 2023-10-13

## 2023-10-10 RX ORDER — ENOXAPARIN SODIUM 100 MG/ML
30 INJECTION SUBCUTANEOUS EVERY 24 HOURS
Refills: 0 | Status: DISCONTINUED | OUTPATIENT
Start: 2023-10-10 | End: 2023-10-13

## 2023-10-10 RX ORDER — INSULIN LISPRO 100/ML
VIAL (ML) SUBCUTANEOUS
Refills: 0 | Status: DISCONTINUED | OUTPATIENT
Start: 2023-10-10 | End: 2023-10-13

## 2023-10-10 RX ORDER — CARVEDILOL PHOSPHATE 80 MG/1
0 CAPSULE, EXTENDED RELEASE ORAL
Qty: 0 | Refills: 1 | DISCHARGE

## 2023-10-10 RX ORDER — DEXTROSE 50 % IN WATER 50 %
15 SYRINGE (ML) INTRAVENOUS ONCE
Refills: 0 | Status: DISCONTINUED | OUTPATIENT
Start: 2023-10-10 | End: 2023-10-13

## 2023-10-10 RX ORDER — SODIUM CHLORIDE 9 MG/ML
1000 INJECTION, SOLUTION INTRAVENOUS
Refills: 0 | Status: DISCONTINUED | OUTPATIENT
Start: 2023-10-10 | End: 2023-10-13

## 2023-10-10 RX ORDER — ONDANSETRON 8 MG/1
4 TABLET, FILM COATED ORAL EVERY 8 HOURS
Refills: 0 | Status: DISCONTINUED | OUTPATIENT
Start: 2023-10-10 | End: 2023-10-13

## 2023-10-10 RX ORDER — MECLIZINE HCL 12.5 MG
1 TABLET ORAL
Qty: 0 | Refills: 0 | DISCHARGE

## 2023-10-10 RX ORDER — ALBUTEROL 90 UG/1
0 AEROSOL, METERED ORAL
Qty: 0 | Refills: 2 | DISCHARGE

## 2023-10-10 RX ORDER — CARVEDILOL PHOSPHATE 80 MG/1
12.5 CAPSULE, EXTENDED RELEASE ORAL EVERY 12 HOURS
Refills: 0 | Status: DISCONTINUED | OUTPATIENT
Start: 2023-10-10 | End: 2023-10-13

## 2023-10-10 RX ORDER — LANOLIN ALCOHOL/MO/W.PET/CERES
3 CREAM (GRAM) TOPICAL AT BEDTIME
Refills: 0 | Status: DISCONTINUED | OUTPATIENT
Start: 2023-10-10 | End: 2023-10-13

## 2023-10-10 RX ORDER — ASPIRIN/CALCIUM CARB/MAGNESIUM 324 MG
81 TABLET ORAL DAILY
Refills: 0 | Status: DISCONTINUED | OUTPATIENT
Start: 2023-10-10 | End: 2023-10-13

## 2023-10-10 RX ADMIN — CARVEDILOL PHOSPHATE 12.5 MILLIGRAM(S): 80 CAPSULE, EXTENDED RELEASE ORAL at 18:06

## 2023-10-10 RX ADMIN — Medication 81 MILLIGRAM(S): at 12:07

## 2023-10-10 NOTE — CONSULT NOTE ADULT - ATTENDING COMMENTS
HI outlined above  89F with 5 minute episode of lightheadedness and HA that has resolved. SBPs in the 200s on arrival    Exam; AOx3, speech is fluent. No aphasia  EOMI, VFF, face symmetric  No Drift. 5/5 strength throughout  Intact LT b/l  FNF intact    Imaging  NONCONTRAST HEAD CT SCAN:  1. No CT evidence of acute intracranial pathology.  2. 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  3. There is a right parietal scalp hematoma. No evidence of acute intracranial hemorrhage, subdural collection or calvarial fracture.    CT ANGIOGRAPHY NECK:  1. Patent cervical vasculature. No hemodynamically significant carotid stenosis or flow-limiting vertebral artery stenosis. No carotid or vertebral dissection.  2. Focal tortuosity and ectasia of the distal cervical internal carotid artery with hairpin turns. Mild tortuosity of the mid cervical internal carotid artery. The findings are likely related to underlying systemic hypertension.  3. A nonspecific irregular nodular opacity in the right upper lobe measures up to 6 x 5 x 1.4 cm and demonstrates linear opacities extending to the pleural surface. If there are no prior outside hospital imaging studies to document long-term stability, a follow-up chest CT may be considered in 3 months to exclude interval change.  4. Multiple thyroid nodules measure up to 2.2 cm. If clinically warranted these may be further evaluated with a nonemergent ultrasound.    CT ANGIOGRAPHY BRAIN: No evidence of a major vessel occlusion, flow-limiting stenosis or aneurysm about the [place]. Normal anatomic variants as discussed above.    POSTCONTRAST HEAD CT SCAN: No CT evidence of or intracranial mass lesion, abnormal enhancement or an arteriovenous malformation. The dural venous sinuses and cavernous sinuses are patent.    Impression: Likely HTN emergency. Low suspicion for stroke or TIA. Agree with plan outlined above.   Lung and thyroid nodule(s) w/u per primary team

## 2023-10-10 NOTE — H&P ADULT - TIME BILLING
Time-based billing (NON-critical care).     80 minutes spent on total encounter; more than 50% of the visit was spent counseling and / or coordinating care by the attending physician.  The necessity of the time spent during the encounter on this date of service was due to:     review of laboratory data, radiology results, consultants' recommendations, documentation in [place], discussion with patient/ACP and interdisciplinary staff (such as , social workers, etc). Interventions were performed as documented above.

## 2023-10-10 NOTE — H&P ADULT - NSHPLABSRESULTS_GEN_ALL_CORE
.  LABS:                         14.0   4.77  )-----------( 182      ( 09 Oct 2023 23:16 )             43.8     10-09    141  |  100  |  7   ----------------------------<  137<H>  3.5   |  30  |  0.64    Ca    9.4      09 Oct 2023 23:16    TPro  7.3  /  Alb  4.4  /  TBili  0.4  /  DBili  x   /  AST  23  /  ALT  10  /  AlkPhos  67  10-09    PT/INR - ( 09 Oct 2023 23:16 )   PT: 11.1 sec;   INR: 0.98 ratio         PTT - ( 09 Oct 2023 23:16 )  PTT:36.4 sec  Urinalysis Basic - ( 09 Oct 2023 23:16 )    Color: x / Appearance: x / SG: x / pH: x  Gluc: 137 mg/dL / Ketone: x  / Bili: x / Urobili: x   Blood: x / Protein: x / Nitrite: x   Leuk Esterase: x / RBC: x / WBC x   Sq Epi: x / Non Sq Epi: x / Bacteria: x                RADIOLOGY, EKG & ADDITIONAL TESTS:    EKG: personally reviewed. NSR, LVH   BMP/CBC: personally reviewed. Cr and Hg WNL   CXR: personally reviewed. Clear lungs

## 2023-10-10 NOTE — CONSULT NOTE ADULT - ASSESSMENT
Assessment: 89y (26-Sep-1930) woman with a PMHx significant for history of vertigo, TIA, HTN, DM, Breast cancer presenting with an episode of dizziness and headache. Patient describes dizziness as  "lightheadedness", she denies room spinning sensation. She reports all her symptoms have resolved and she is feeling better. She denies weakness, numbness/lightness, vision changes, changes in speech, N/V, palpitations. She reports no residual symptoms of prior "mini strokes". She says she occasionally has headaches and aspirin will alleviate it. Her headache was localized to bifrontal. NIHSS 0. BP in triage was 224/120. At baseline she walks with a cane or on her own. She lives with her niece and seems to have good family support. Neuro exam unremarkable. No acute findings on CTH. 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 CTA pertinent for a tortuous ICAs (R>L).    Impression: transient headache and lightheadedness with no focal deficit 2/2 hypertensive emergency. Could better control risk factors to prevent future strokes such as hypertension & diabetic management    Plan  continue aspirin 81mg daily (home medication)  EKG  TTE   Lipid panel, A1c    Case to be seen and discussed with neurology attending.

## 2023-10-10 NOTE — CONSULT NOTE ADULT - SUBJECTIVE AND OBJECTIVE BOX
Neurology - Consult Note    -  Spectra: [id number] ([facility]), [id number] ([facility])  -    HPI: Patient BIJU TEJEDA is a 89y (26-Sep-1930) woman with a PMHx significant for history of vertigo, TIA, HTN, DM, Breast cancer presenting with an episode of dizziness and headache. Patient describes dizziness as  "lightheadedness", she denies room spinning sensation. She reports all her symptoms have resolved and she is feeling better. She denies weakness, numbness/lightness, vision changes, changes in speech, N/V, palpitations. She reports no residual symptoms of prior "mini strokes". She says she occasionally has headaches and aspirin will alleviate it. Her headache was localized to bifrontal. NIHSS 0. BP in triage was 224/120. At baseline she walks with a cane or on her own. She lives with her niece and seems to have good family support.     Review of Systems: refer to hpi    Allergies: codeine (Rash)    PMHx/PSHx/Family Hx: As above, otherwise see below   Hypertension  Asthma  Breast cancer  DM (diabetes mellitus)  Acute UTI    Social Hx:  No current use of tobacco, alcohol, or illicit drugs  Lives with niece    Medications:  MEDICATIONS  (STANDING):  aspirin  chewable 81 milliGRAM(s) Oral daily  dextrose 5%. 1000 milliLiter(s) (50 mL/Hr) IV Continuous <Continuous>  dextrose 50% Injectable 25 Gram(s) IV Push once  enoxaparin Injectable 30 milliGRAM(s) SubCutaneous every 24 hours  glucagon  Injectable 1 milliGRAM(s) IntraMuscular once  insulin lispro (ADMELOG) corrective regimen sliding scale   SubCutaneous three times a day before meals    MEDICATIONS  (PRN):  acetaminophen     Tablet .. 650 milliGRAM(s) Oral every 6 hours PRN Temp greater or equal to 38C (100.4F), Mild Pain (1 - 3)  aluminum hydroxide/magnesium hydroxide/simethicone Suspension 30 milliLiter(s) Oral every 4 hours PRN Dyspepsia  dextrose Oral Gel 15 Gram(s) Oral once PRN Blood Glucose LESS THAN 70 milliGRAM(s)/deciliter  meclizine 25 milliGRAM(s) Oral every 6 hours PRN Dizziness  melatonin 3 milliGRAM(s) Oral at bedtime PRN Insomnia  ondansetron Injectable 4 milliGRAM(s) IV Push every 8 hours PRN Nausea and/or Vomiting    Vitals:  T(C): 36.8 (10-10-23 @ 12:24), Max: 37.1 (10-10-23 @ 06:10)  HR: 80 (10-10-23 @ 12:24) (77 - 91)  BP: 180/87 (10-10-23 @ 12:24) (175/86 - 224/122)  RR: 18 (10-10-23 @ 12:24) (16 - 20)  SpO2: 100% (10-10-23 @ 12:24) (97% - 100%)    Physical Examination:  General - NAD    Neurologic Exam:  Mental status - Awake, Alert, Oriented to person, place, and time. Speech fluent, repetition and naming intact. Follows simple and complex commands. Attention/concentration, recent and remote memory (including registration and recall), and fund of knowledge intact    Cranial nerves - PERRLA, VFF, EOMI, face sensation (V1-V3) intact b/l, facial strength intact without asymmetry b/l, hearing intact b/l, palate with symmetric elevation, trapezius OR sternocleidomastiod 5/5 strength b/l, tongue midline on protrusion with full lateral movement    Motor - Normal bulk and tone throughout. No pronator drift.  Strength testing            Deltoid      Biceps      Triceps            R            5                 5               5                     5               L             5                 5               5                     5                                            Hip Flexion      Knee Flexion    Knee Extension    Dorsiflexion    Plantar Flexion  R              5                           5                       5                     5                            5                            L              5                           5                        5                     5                            5                             Sensation - Light touch intact throughout    DTR's -             Biceps      Triceps     Brachioradialis      Patellar    Ankle    Toes/plantar response  R             2+             2+                  2+                       2+            2+                 Down  L              2+             2+                 2+                        2+           2+                 Down    Coordination - Finger to Nose intact b/l. No tremors appreciated    Gait and station - Normal casual gait.     Labs:                        14.0   4.77  )-----------( 182      ( 09 Oct 2023 23:16 )             43.8     10-09    141  |  100  |  7   ----------------------------<  137<H>  3.5   |  30  |  0.64    Ca    9.4      09 Oct 2023 23:16    TPro  7.3  /  Alb  4.4  /  TBili  0.4  /  DBili  x   /  AST  23  /  ALT  10  /  AlkPhos  67  10-09    CAPILLARY BLOOD GLUCOSE      POCT Blood Glucose.: 117 mg/dL (10 Oct 2023 05:30)    LIVER FUNCTIONS - ( 09 Oct 2023 23:16 )  Alb: 4.4 g/dL / Pro: 7.3 g/dL / ALK PHOS: 67 U/L / ALT: 10 U/L / AST: 23 U/L / GGT: x           PT/INR - ( 09 Oct 2023 23:16 )   PT: 11.1 sec;   INR: 0.98 ratio      PTT - ( 09 Oct 2023 23:16 )  PTT:36.4 sec    Radiology:    NONCONTRAST HEAD CT SCAN:  1. No CT evidence of acute intracranial pathology.  2. 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  3. There is a right parietal scalp hematoma. No evidence of acute intracranial hemorrhage, subdural collection or calvarial fracture.    CT ANGIOGRAPHY NECK:  1. Patent cervical vasculature. No hemodynamically significant carotid stenosis or flow-limiting vertebral artery stenosis. No carotid or vertebral dissection.  2. Focal tortuosity and ectasia of the distal cervical internal carotid artery with hairpin turns. Mild tortuosity of the mid cervical internal carotid artery. The findings are likely related to underlying systemic hypertension.  3. A nonspecific irregular nodular opacity in the right upper lobe measures up to 6 x 5 x 1.4 cm and demonstrates linear opacities extending to the pleural surface. If there are no prior outside hospital imaging studies to document long-term stability, a follow-up chest CT may be considered in 3 months to exclude interval change.  4. Multiple thyroid nodules measure up to 2.2 cm. If clinically warranted these may be further evaluated with a nonemergent ultrasound.    CT ANGIOGRAPHY BRAIN: No evidence of a major vessel occlusion, flow-limiting stenosis or aneurysm about the [place]. Normal anatomic variants as discussed above.    POSTCONTRAST HEAD CT SCAN: No CT evidence of or intracranial mass lesion, abnormal enhancement or an arteriovenous malformation. The dural venous sinuses and cavernous sinuses are patent.       Neurology - Consult Note    -  Spectra: [id number] ([facility]), [phone] ([facility])  -    HPI: Patient BIJU TEJEDA is a 89y (26-Sep-1930) woman with a PMHx significant for history of vertigo, TIA, HTN, DM, Breast cancer presenting with an episode of dizziness and headache. Patient describes dizziness as  "lightheadedness", she denies room spinning sensation. She reports all her symptoms have resolved and she is feeling better. She denies weakness, numbness/lightness, vision changes, changes in speech, N/V, palpitations. She reports no residual symptoms of prior "mini strokes". She says she occasionally has headaches and aspirin will alleviate it. Her headache was localized to bifrontal. NIHSS 0B. BP in triage was 224/120. At baseline she walks with a cane or on her own. She lives with her niece and seems to have good family support.     Review of Systems: refer to hpi    Allergies: codeine (Rash)    PMHx/PSHx/Family Hx: As above, otherwise see below   Hypertension  Asthma  Breast cancer  DM (diabetes mellitus)  Acute UTI    Social Hx:  No current use of tobacco, alcohol, or illicit drugs  Lives with niece    Medications:  MEDICATIONS  (STANDING):  aspirin  chewable 81 milliGRAM(s) Oral daily  dextrose 5%. 1000 milliLiter(s) (50 mL/Hr) IV Continuous <Continuous>  dextrose 50% Injectable 25 Gram(s) IV Push once  enoxaparin Injectable 30 milliGRAM(s) SubCutaneous every 24 hours  glucagon  Injectable 1 milliGRAM(s) IntraMuscular once  insulin lispro (ADMELOG) corrective regimen sliding scale   SubCutaneous three times a day before meals    MEDICATIONS  (PRN):  acetaminophen     Tablet .. 650 milliGRAM(s) Oral every 6 hours PRN Temp greater or equal to 38C (100.4F), Mild Pain (1 - 3)  aluminum hydroxide/magnesium hydroxide/simethicone Suspension 30 milliLiter(s) Oral every 4 hours PRN Dyspepsia  dextrose Oral Gel 15 Gram(s) Oral once PRN Blood Glucose LESS THAN 70 milliGRAM(s)/deciliter  meclizine 25 milliGRAM(s) Oral every 6 hours PRN Dizziness  melatonin 3 milliGRAM(s) Oral at bedtime PRN Insomnia  ondansetron Injectable 4 milliGRAM(s) IV Push every 8 hours PRN Nausea and/or Vomiting    Vitals:  T(C): 36.8 (10-10-23 @ 12:24), Max: 37.1 (10-10-23 @ 06:10)  HR: 80 (10-10-23 @ 12:24) (77 - 91)  BP: 180/87 (10-10-23 @ 12:24) (175/86 - 224/122)  RR: 18 (10-10-23 @ 12:24) (16 - 20)  SpO2: 100% (10-10-23 @ 12:24) (97% - 100%)    Physical Examination:  General - NAD    Neurologic Exam:  Mental status - Awake, Alert, Oriented to person, place, and time. Speech fluent, repetition and naming intact. Follows simple and complex commands. Attention/concentration, recent and remote memory (including registration and recall), and fund of knowledge intact    Cranial nerves - PERRLA, VFF, EOMI, face sensation (V1-V3) intact b/l, facial strength intact without asymmetry b/l, hearing intact b/l, palate with symmetric elevation, trapezius OR sternocleidomastiod 5/5 strength b/l, tongue midline on protrusion with full lateral movement    Motor - Normal bulk and tone throughout. No pronator drift.  Strength testing            Deltoid      Biceps      Triceps            R            5                 5               5                     5               L             5                 5               5                     5                                            Hip Flexion      Knee Flexion    Knee Extension    Dorsiflexion    Plantar Flexion  R              5                           5                       5                     5                            5                            L              5                           5                        5                     5                            5                             Sensation - Light touch intact throughout    DTR's -             Biceps      Triceps     Brachioradialis      Patellar    Ankle    Toes/plantar response  R             2+             2+                  2+                       2+            2+                 Down  L              2+             2+                 2+                        2+           2+                 Down    Coordination - Finger to Nose intact b/l. No tremors appreciated    Gait and station - Normal casual gait.     Labs:                        14.0   4.77  )-----------( 182      ( 09 Oct 2023 23:16 )             43.8     10-09    141  |  100  |  7   ----------------------------<  137<H>  3.5   |  30  |  0.64    Ca    9.4      09 Oct 2023 23:16    TPro  7.3  /  Alb  4.4  /  TBili  0.4  /  DBili  x   /  AST  23  /  ALT  10  /  AlkPhos  67  10-09    CAPILLARY BLOOD GLUCOSE      POCT Blood Glucose.: 117 mg/dL (10 Oct 2023 05:30)    LIVER FUNCTIONS - ( 09 Oct 2023 23:16 )  Alb: 4.4 g/dL / Pro: 7.3 g/dL / ALK PHOS: 67 U/L / ALT: 10 U/L / AST: 23 U/L / GGT: x           PT/INR - ( 09 Oct 2023 23:16 )   PT: 11.1 sec;   INR: 0.98 ratio      PTT - ( 09 Oct 2023 23:16 )  PTT:36.4 sec    Radiology:    NONCONTRAST HEAD CT SCAN:  1. No CT evidence of acute intracranial pathology.  2. 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  3. There is a right parietal scalp hematoma. No evidence of acute intracranial hemorrhage, subdural collection or calvarial fracture.    CT ANGIOGRAPHY NECK:  1. Patent cervical vasculature. No hemodynamically significant carotid stenosis or flow-limiting vertebral artery stenosis. No carotid or vertebral dissection.  2. Focal tortuosity and ectasia of the distal cervical internal carotid artery with hairpin turns. Mild tortuosity of the mid cervical internal carotid artery. The findings are likely related to underlying systemic hypertension.  3. A nonspecific irregular nodular opacity in the right upper lobe measures up to 6 x 5 x 1.4 cm and demonstrates linear opacities extending to the pleural surface. If there are no prior outside hospital imaging studies to document long-term stability, a follow-up chest CT may be considered in 3 months to exclude interval change.  4. Multiple thyroid nodules measure up to 2.2 cm. If clinically warranted these may be further evaluated with a nonemergent ultrasound.    CT ANGIOGRAPHY BRAIN: No evidence of a major vessel occlusion, flow-limiting stenosis or aneurysm about the [place]. Normal anatomic variants as discussed above.    POSTCONTRAST HEAD CT SCAN: No CT evidence of or intracranial mass lesion, abnormal enhancement or an arteriovenous malformation. The dural venous sinuses and cavernous sinuses are patent.

## 2023-10-10 NOTE — H&P ADULT - HISTORY OF PRESENT ILLNESS
89 yo F with PMH HTN, DM, vertigo, breast cancer in remission, frequent UTIs, asthma presenting with 1 hour of dizziness and pre-syncope. Patient was making a grill cheese sandwich for herself when she felt lightheaded and with a frontal headache. Headache is still ongoing. Hasn’t take any meds, but usually aspirin works for this HA. She told family, who prompted patient to come in.     Patient currently denies dizziness. She states this has been an ongoing problem for years now. Has been prescribed meclizine. However, per son over telephone, patient is non-adherent to medications     In the ED: patient underwent CT head and CTA head and neck. Prior infarcts found, but no acute findings or significant vascular issues. Blood pressures on admission found to be hypertensive to 224/122. Now improved without meds to 177/88

## 2023-10-10 NOTE — H&P ADULT - ASSESSMENT
89 yo F with PMH HTN, DM, vertigo, breast cancer in remission, frequent UTIs, asthma presenting with 1 hour of dizziness and pre-syncope prior to admission.

## 2023-10-10 NOTE — PATIENT PROFILE ADULT - FALL HARM RISK - RISK INTERVENTIONS

## 2023-10-10 NOTE — H&P ADULT - PROBLEM SELECTOR PLAN 5
- A nonspecific irregular nodular opacity in the right upper lobe  measures up to 6 x 5 x 1.4 cm and demonstrates linear opacities extending to the pleural surface.  Follow-up chest CT may be considered in 3  months to exclude interval change.  - Multiple thyroid nodules measure up to 2.2 cm.  If clinically warranted these may be further evaluated with a nonemergent ultrasound as OP

## 2023-10-10 NOTE — H&P ADULT - NSHPREVIEWOFSYSTEMS_GEN_ALL_CORE
REVIEW OF SYSTEMS:    CONSTITUTIONAL: No weakness, fevers or chills  EYES/ENT: No visual changes;  No vertigo or throat pain   NECK: No pain or stiffness  RESPIRATORY: No cough, wheezing, hemoptysis; No shortness of breath  CARDIOVASCULAR: No chest pain or palpitations  GASTROINTESTINAL: No abdominal or epigastric pain. No nausea, vomiting, or hematemesis; No diarrhea or constipation. No melena or hematochezia.  GENITOURINARY: No dysuria, frequency or hematuria  NEUROLOGICAL: + dizziness, no numbness or weakness  SKIN: No itching, rashes

## 2023-10-10 NOTE — ED PROVIDER NOTE - ATTENDING CONTRIBUTION TO CARE
Alternatively describing dizziness /presyncopal dizziness in female with h/o breast CA episode also noted difficulty speaking; / steal syndrome will ct cta neuro consult labs ua TBA

## 2023-10-10 NOTE — ED PROVIDER NOTE - PHYSICAL EXAMINATION
GENERAL: well appearing in no acute distress  HEAD: normocephalic, atraumatic  HEENT: normal conjunctiva, oral mucosa moist  CARDIAC: regular rate and rhythm, no appreciable murmurs, 2+ pulses in UE/LE b/l  PULM: normal breath sounds, clear to ascultation bilaterally, no rales, rhonchi, wheezing  GI: abdomen nondistended, soft, nontender, no guarding, rebound tenderness  NEURO: no focal motor or sensory deficits, CN2-12 intact, normal speech, PERRLA, EOMI, normal gait, no pronator drift, no facial droop, AAOx3  MSK: no peripheral edema, no calf tenderness b/l, strength 5/5 in all extremities   SKIN: well-perfused, extremities warm, no visible rashes  PSYCH: appropriate mood and affect

## 2023-10-10 NOTE — H&P ADULT - NSHPPHYSICALEXAM_GEN_ALL_CORE
.  VITAL SIGNS:  T(C): 37.1 (10-10-23 @ 06:10), Max: 37.1 (10-10-23 @ 06:10)  T(F): 98.8 (10-10-23 @ 06:10), Max: 98.8 (10-10-23 @ 06:10)  HR: 77 (10-10-23 @ 06:10) (77 - 91)  BP: 175/86 (10-10-23 @ 06:10) (175/86 - 224/122)  BP(mean): 111 (10-10-23 @ 06:10) (111 - 111)  RR: 20 (10-10-23 @ 06:10) (16 - 20)  SpO2: 100% (10-10-23 @ 06:10) (97% - 100%)  Wt(kg): --    PHYSICAL EXAM:    Constitutional: NAD, Elderly   Head: NC/AT  Eyes: anicteric sclera  Respiratory: clear to ascultation b/l, No wheezing or rhonchi, no retractions   Cardiac: +S1/S2; RRR; no M/R/G  Gastrointestinal: abdomen soft, non-distended, no rebound or guarding; +BSx4  Extremities: no peripheral edema  Musculoskeletal: NROM x4; no joint swelling, tenderness or erythema  Vascular: 2+ radial, DP pulses B/L  Neurologic: AAOx3; moving all extremities   Psychiatric: affect and characteristics of appearance, verbalizations, behaviors are appropriate

## 2023-10-10 NOTE — H&P ADULT - PROBLEM SELECTOR PLAN 6
DVT ppx: lovenox 30 mg daily   Diet: CC  Code status: per son, has not had discussion yet. Advised son to have discussion while patient is able. Currently full code.

## 2023-10-10 NOTE — ED PROVIDER NOTE - CLINICAL SUMMARY MEDICAL DECISION MAKING FREE TEXT BOX
89-year-old female history of hypertension, diabetes, vertigo, breast cancer in remission, frequent UTIs, asthma presenting for evaluation of 1 hour episodes of room spinning dizziness and feeling like she was going to pass out associated with difficulty speaking from 4 to 5 PM this afternoon.  Patient states her symptoms are resolved at this time.  Patient is afebrile hemodynamically stable with no focal neurologic deficits on exam. Pt reports she did not take her BP meds today.     Differential diagnosis includes but is not limited to TIA versus stroke versus syncope versus metabolic derangement versus infection.  Will get CT and CTA of the head and neck, chest x-ray, UA, urine culture, CBC, CMP, troponin, VBG.  Patient likely will need admission.

## 2023-10-10 NOTE — ED PROVIDER NOTE - OBJECTIVE STATEMENT
89-year-old female history of hypertension, diabetes, vertigo, breast cancer in remission, frequent UTIs, asthma presenting for evaluation of 1 hour episodes of room spinning dizziness and feeling like she was going to pass out associated with difficulty speaking from 4 to 5 PM this afternoon.  Patient states her symptoms are resolved at this time.  Denies headache, difficulty walking, vision changes, shortness of breath, chest pain, prior episodes, weakness. Pt c/o increased urinary frequency, denies burning with urination, fever

## 2023-10-10 NOTE — ED PROVIDER NOTE - PROGRESS NOTE DETAILS
Tina Ag MD, PGY2:  Reviewed results of labs and imaging, labs are nonactionable at this time, CTs of the head and neck show no evidence of major vessel occlusion, no intracranial mass lesion, AV malformation, or bleed.  Incidentally CT noted concern for right parietal scalp hematoma, patient reexamined at bedside, unable to visualize noted scalp hematoma on exam.  Patient denies any head trauma.  Patient informed of findings and plan for admission for syncope versus TIA work-up, patient consents.  All questions answered at this time.

## 2023-10-10 NOTE — H&P ADULT - PROBLEM SELECTOR PLAN 2
- noted to have multiple falls over the past year. Unclear about exact details   - CT head with parietal scalp hematoma   - needs PT eval

## 2023-10-10 NOTE — H&P ADULT - PROBLEM SELECTOR PLAN 1
- 1 hour of dizziness and pre-syncope while making food and in significant hypertension (224/122)  - noted history of presyncope and vertigo   - CT head and CTA head and neck with No CT evidence of acute intracranial pathology. 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 Patent cervical vasculature. No evidence of a major vessel occlusion, flow-limiting stenosis or aneurysm about the [place].  The dural venous sinuses and cavernous sinuses are patent.  - continue aspirin 81 mg daily   - meclizine 25 mg q6 prn for dizziness   - TTE  - Tele   - orthostatics   -neurology consult

## 2023-10-10 NOTE — ED PROVIDER NOTE - NSICDXPASTMEDICALHX_GEN_ALL_CORE_FT
PAST MEDICAL HISTORY:  Acute UTI     Asthma     Breast cancer     DM (diabetes mellitus)     Hypertension

## 2023-10-10 NOTE — ED ADULT NURSE REASSESSMENT NOTE - NS ED NURSE REASSESS COMMENT FT1
Patient in room 6. A&O4. Respirations even and unlabored. Lying in stretcher comfortably. Stretcher in lowest position. Call bell within reach. Pending bed assignment. Comfort and safety maintained.
Patient received in room 6. A&O4. Respirations even and unlabored. Report given from MECHE garcia. No signs of acute distress noted. Comfort and safety maintained. Stretcher in lowest position. Call bell within reach.
Break RN note- Patient resting quietly in bed, breathing even and nonlabored. No acute distress. Patient denies any dizziness at this time. Cardiac monitor in place- sinus rhythm. Awaiting CT. safety maintained. Patient stable upon exiting the room.
Pt a&ox3, denies headache, dizziness, and lightheadedness, no distress, pt given call bell to call nurse at any time.

## 2023-10-10 NOTE — PATIENT PROFILE ADULT - FUNCTIONAL ASSESSMENT - DAILY ACTIVITY 3.
.  ENDOSCOPIES 11/2021- 1 ta and 1 benign polyp removed  LABS last from 2018- elevated alt at 45 noted.  outpt pcp labs pending  IMAGES last us in 2018 with fatty liver
4 = No assist / stand by assistance

## 2023-10-10 NOTE — ED PROVIDER NOTE - NS ED MD DISPO DIVISION
Mouth/Throat: Uvula is midline, oropharynx is clear and moist and mucous membranes are normal.   Eyes: Pupils are equal, round, and reactive to light. Conjunctivae, EOM and lids are normal.   Neck: Trachea normal, normal range of motion and phonation normal. Neck supple. Carotid bruit is not present. No thyroid mass and no thyromegaly present. Cardiovascular: Normal rate, regular rhythm, S1 normal, S2 normal, normal heart sounds and normal pulses. Exam reveals no gallop and no friction rub. No murmur heard. Pulmonary/Chest: Effort normal.   Musculoskeletal:        Right shoulder: He exhibits decreased range of motion, tenderness, pain, spasm and decreased strength. Left shoulder: He exhibits decreased range of motion and tenderness. Left elbow: Tenderness found. Right knee: He exhibits decreased range of motion. Tenderness found. Left knee: He exhibits decreased range of motion. Tenderness found. Lumbar back: He exhibits decreased range of motion, tenderness, pain and spasm. Neurological: He is alert and oriented to person, place, and time. Left arm and 4tht and 5th digit have some decreased sensation. Some decreased  strenght in the left hand   Skin: Skin is warm and dry. Bilateral lower extremity redness and warmth, he has some plus 2 edema below the knee bilaterally   Psychiatric: He has a normal mood and affect. Nursing note and vitals reviewed. No results found for requested labs within last 30 days.        Hemoglobin A1C (%)   Date Value   05/18/2018 9.6 (H)     Microscopic Examination (no units)   Date Value   12/28/2017 Not Indicated     LDL Calculated (mg/dL)   Date Value   08/08/2017 99         Lab Results   Component Value Date    WBC 6.3 05/18/2018    NEUTROABS 4.7 02/21/2018    HGB 13.8 05/18/2018    HCT 44.9 05/18/2018    HCT 41.6 06/05/2012    MCV 98.9 05/18/2018     05/18/2018     06/05/2012       Lab Results   Component Value up to 30 days. 30 day supply. Dispense:  150 tablet     Refill:  0        Medications Discontinued During This Encounter   Medication Reason    ferrous sulfate 325 (65 Fe) MG tablet DUPLICATE    insulin glargine (LANTUS SOLOSTAR) 100 UNIT/ML injection pen Alternate therapy    HYDROcodone-acetaminophen (NORCO)  MG per tablet REORDER    oxyCODONE (OXY-IR) 30 MG immediate release tablet REORDER    HYDROcodone-acetaminophen (NORCO)  MG per tablet REORDER    oxyCODONE (OXY-IR) 30 MG immediate release tablet REORDER       Controlled Substances Monitoring: Attestation: The Prescription Monitoring Report for this patient was reviewed today. Pema Boateng MD)  Documentation: Random urine drug screen sent today., No signs of potential drug abuse or diversion identified.  Pema Boateng MD) THA

## 2023-10-10 NOTE — PATIENT PROFILE ADULT - SURGICAL SITE INCISION
Patient: Jud Arnold    Procedure Summary     Date:  06/29/20 Room / Location:  [facility] OR  / [facility] OR    Anesthesia Start:  7668 Anesthesia Stop:  9023    Procedure:  KNEE TOTAL REPLACEMENT (Right ) Diagnosis:  (right knee degenerative joint
no

## 2023-10-10 NOTE — H&P ADULT - PROBLEM SELECTOR PLAN 3
- hypertensive to 224/122. Now improved without meds to 177/88  - son endorsees med non-adherence   - hold home coreg 12.5 BID and HCTZ 25 mg daily until cleared by neurology

## 2023-10-11 DIAGNOSIS — I16.0 HYPERTENSIVE URGENCY: ICD-10-CM

## 2023-10-11 LAB
A1C WITH ESTIMATED AVERAGE GLUCOSE RESULT: 6.4 % — HIGH (ref 4–5.6)
ALBUMIN SERPL ELPH-MCNC: 3.5 G/DL — SIGNIFICANT CHANGE UP (ref 3.3–5)
ALP SERPL-CCNC: 56 U/L — SIGNIFICANT CHANGE UP (ref 40–120)
ALT FLD-CCNC: 7 U/L — SIGNIFICANT CHANGE UP (ref 4–33)
ANION GAP SERPL CALC-SCNC: 10 MMOL/L — SIGNIFICANT CHANGE UP (ref 7–14)
ANISOCYTOSIS BLD QL: SLIGHT — SIGNIFICANT CHANGE UP
AST SERPL-CCNC: 19 U/L — SIGNIFICANT CHANGE UP (ref 4–32)
BASOPHILS # BLD AUTO: 0.05 K/UL — SIGNIFICANT CHANGE UP (ref 0–0.2)
BASOPHILS NFR BLD AUTO: 1.8 % — SIGNIFICANT CHANGE UP (ref 0–2)
BILIRUB SERPL-MCNC: 0.4 MG/DL — SIGNIFICANT CHANGE UP (ref 0.2–1.2)
BUN SERPL-MCNC: 16 MG/DL — SIGNIFICANT CHANGE UP (ref 7–23)
CALCIUM SERPL-MCNC: 9.4 MG/DL — SIGNIFICANT CHANGE UP (ref 8.4–10.5)
CHLORIDE SERPL-SCNC: 103 MMOL/L — SIGNIFICANT CHANGE UP (ref 98–107)
CHOLEST SERPL-MCNC: 195 MG/DL — SIGNIFICANT CHANGE UP
CO2 SERPL-SCNC: 28 MMOL/L — SIGNIFICANT CHANGE UP (ref 22–31)
CREAT SERPL-MCNC: 0.88 MG/DL — SIGNIFICANT CHANGE UP (ref 0.5–1.3)
CULTURE RESULTS: SIGNIFICANT CHANGE UP
EGFR: 61 ML/MIN/1.73M2 — SIGNIFICANT CHANGE UP
EOSINOPHIL # BLD AUTO: 0.11 K/UL — SIGNIFICANT CHANGE UP (ref 0–0.5)
EOSINOPHIL NFR BLD AUTO: 3.6 % — SIGNIFICANT CHANGE UP (ref 0–6)
ESTIMATED AVERAGE GLUCOSE: 137 — SIGNIFICANT CHANGE UP
GIANT PLATELETS BLD QL SMEAR: PRESENT — SIGNIFICANT CHANGE UP
GLUCOSE BLDC GLUCOMTR-MCNC: 116 MG/DL — HIGH (ref 70–99)
GLUCOSE BLDC GLUCOMTR-MCNC: 136 MG/DL — HIGH (ref 70–99)
GLUCOSE BLDC GLUCOMTR-MCNC: 138 MG/DL — HIGH (ref 70–99)
GLUCOSE BLDC GLUCOMTR-MCNC: 215 MG/DL — HIGH (ref 70–99)
GLUCOSE BLDC GLUCOMTR-MCNC: 79 MG/DL — SIGNIFICANT CHANGE UP (ref 70–99)
GLUCOSE SERPL-MCNC: 130 MG/DL — HIGH (ref 70–99)
HCT VFR BLD CALC: 40.9 % — SIGNIFICANT CHANGE UP (ref 34.5–45)
HDLC SERPL-MCNC: 47 MG/DL — LOW
HGB BLD-MCNC: 12.8 G/DL — SIGNIFICANT CHANGE UP (ref 11.5–15.5)
IANC: 0.93 K/UL — LOW (ref 1.8–7.4)
LIPID PNL WITH DIRECT LDL SERPL: 136 MG/DL — HIGH
LYMPHOCYTES # BLD AUTO: 1.06 K/UL — SIGNIFICANT CHANGE UP (ref 1–3.3)
LYMPHOCYTES # BLD AUTO: 35.1 % — SIGNIFICANT CHANGE UP (ref 13–44)
MACROCYTES BLD QL: SLIGHT — SIGNIFICANT CHANGE UP
MANUAL SMEAR VERIFICATION: SIGNIFICANT CHANGE UP
MCHC RBC-ENTMCNC: 26.4 PG — LOW (ref 27–34)
MCHC RBC-ENTMCNC: 31.3 GM/DL — LOW (ref 32–36)
MCV RBC AUTO: 84.3 FL — SIGNIFICANT CHANGE UP (ref 80–100)
MONOCYTES # BLD AUTO: 0.38 K/UL — SIGNIFICANT CHANGE UP (ref 0–0.9)
MONOCYTES NFR BLD AUTO: 12.6 % — SIGNIFICANT CHANGE UP (ref 2–14)
NEUTROPHILS # BLD AUTO: 1.17 K/UL — LOW (ref 1.8–7.4)
NEUTROPHILS NFR BLD AUTO: 36.1 % — LOW (ref 43–77)
NEUTS BAND # BLD: 2.7 % — SIGNIFICANT CHANGE UP (ref 0–6)
NON HDL CHOLESTEROL: 148 MG/DL — HIGH
OVALOCYTES BLD QL SMEAR: SIGNIFICANT CHANGE UP
PLAT MORPH BLD: NORMAL — SIGNIFICANT CHANGE UP
PLATELET # BLD AUTO: 167 K/UL — SIGNIFICANT CHANGE UP (ref 150–400)
PLATELET COUNT - ESTIMATE: NORMAL — SIGNIFICANT CHANGE UP
POIKILOCYTOSIS BLD QL AUTO: SLIGHT — SIGNIFICANT CHANGE UP
POLYCHROMASIA BLD QL SMEAR: SLIGHT — SIGNIFICANT CHANGE UP
POTASSIUM SERPL-MCNC: 3.9 MMOL/L — SIGNIFICANT CHANGE UP (ref 3.5–5.3)
POTASSIUM SERPL-SCNC: 3.9 MMOL/L — SIGNIFICANT CHANGE UP (ref 3.5–5.3)
PROT SERPL-MCNC: 6.4 G/DL — SIGNIFICANT CHANGE UP (ref 6–8.3)
RBC # BLD: 4.85 M/UL — SIGNIFICANT CHANGE UP (ref 3.8–5.2)
RBC # FLD: 14.4 % — SIGNIFICANT CHANGE UP (ref 10.3–14.5)
RBC BLD AUTO: ABNORMAL
SMUDGE CELLS # BLD: PRESENT — SIGNIFICANT CHANGE UP
SODIUM SERPL-SCNC: 141 MMOL/L — SIGNIFICANT CHANGE UP (ref 135–145)
SPECIMEN SOURCE: SIGNIFICANT CHANGE UP
TRIGL SERPL-MCNC: 62 MG/DL — SIGNIFICANT CHANGE UP
VARIANT LYMPHS # BLD: 8.1 % — HIGH (ref 0–6)
WBC # BLD: 3.02 K/UL — LOW (ref 3.8–10.5)
WBC # FLD AUTO: 3.02 K/UL — LOW (ref 3.8–10.5)

## 2023-10-11 PROCEDURE — 99232 SBSQ HOSP IP/OBS MODERATE 35: CPT

## 2023-10-11 PROCEDURE — 93306 TTE W/DOPPLER COMPLETE: CPT | Mod: 26

## 2023-10-11 RX ADMIN — Medication 81 MILLIGRAM(S): at 12:36

## 2023-10-11 RX ADMIN — Medication 2: at 12:36

## 2023-10-11 RX ADMIN — ENOXAPARIN SODIUM 30 MILLIGRAM(S): 100 INJECTION SUBCUTANEOUS at 12:36

## 2023-10-11 RX ADMIN — CARVEDILOL PHOSPHATE 12.5 MILLIGRAM(S): 80 CAPSULE, EXTENDED RELEASE ORAL at 05:33

## 2023-10-11 RX ADMIN — CARVEDILOL PHOSPHATE 12.5 MILLIGRAM(S): 80 CAPSULE, EXTENDED RELEASE ORAL at 17:47

## 2023-10-11 NOTE — PHYSICAL THERAPY INITIAL EVALUATION ADULT - ADDITIONAL COMMENTS
Pt states she lives with her niece in a house and has no stairs. Pt's niece is her aide. Prior to admission, pt was ambulating with a cane sometimes. Pt states she may move to her son's house soon. As per EMR, pt has had multiple falls.  Post PT evaluation, pt left semi-supine, alarm on, call bell and remote within reach, all precautions maintained, NAD. MECHE murcia.

## 2023-10-11 NOTE — PHYSICAL THERAPY INITIAL EVALUATION ADULT - LEVEL OF INDEPENDENCE: GAIT, REHAB EVAL
Pt ambulated to bathroom with contact guard, requested to use rolling walker for hallway ambulation/contact guard

## 2023-10-11 NOTE — PHYSICAL THERAPY INITIAL EVALUATION ADULT - NSPTDISCHREC_GEN_A_CORE
Home with home PT services to address current functional limitations to optimize safety within the home environment with the use of a rolling walker.
Statement Selected

## 2023-10-12 LAB
ANION GAP SERPL CALC-SCNC: 10 MMOL/L — SIGNIFICANT CHANGE UP (ref 7–14)
BASOPHILS # BLD AUTO: 0.02 K/UL — SIGNIFICANT CHANGE UP (ref 0–0.2)
BASOPHILS NFR BLD AUTO: 0.6 % — SIGNIFICANT CHANGE UP (ref 0–2)
BUN SERPL-MCNC: 20 MG/DL — SIGNIFICANT CHANGE UP (ref 7–23)
CALCIUM SERPL-MCNC: 9.2 MG/DL — SIGNIFICANT CHANGE UP (ref 8.4–10.5)
CHLORIDE SERPL-SCNC: 100 MMOL/L — SIGNIFICANT CHANGE UP (ref 98–107)
CO2 SERPL-SCNC: 29 MMOL/L — SIGNIFICANT CHANGE UP (ref 22–31)
CREAT SERPL-MCNC: 0.82 MG/DL — SIGNIFICANT CHANGE UP (ref 0.5–1.3)
EGFR: 67 ML/MIN/1.73M2 — SIGNIFICANT CHANGE UP
EOSINOPHIL # BLD AUTO: 0.2 K/UL — SIGNIFICANT CHANGE UP (ref 0–0.5)
EOSINOPHIL NFR BLD AUTO: 6.4 % — HIGH (ref 0–6)
GLUCOSE BLDC GLUCOMTR-MCNC: 127 MG/DL — HIGH (ref 70–99)
GLUCOSE BLDC GLUCOMTR-MCNC: 139 MG/DL — HIGH (ref 70–99)
GLUCOSE BLDC GLUCOMTR-MCNC: 176 MG/DL — HIGH (ref 70–99)
GLUCOSE SERPL-MCNC: 124 MG/DL — HIGH (ref 70–99)
HCT VFR BLD CALC: 40.6 % — SIGNIFICANT CHANGE UP (ref 34.5–45)
HGB BLD-MCNC: 12.8 G/DL — SIGNIFICANT CHANGE UP (ref 11.5–15.5)
IANC: 0.89 K/UL — LOW (ref 1.8–7.4)
IMM GRANULOCYTES NFR BLD AUTO: 0 % — SIGNIFICANT CHANGE UP (ref 0–0.9)
LYMPHOCYTES # BLD AUTO: 1.66 K/UL — SIGNIFICANT CHANGE UP (ref 1–3.3)
LYMPHOCYTES # BLD AUTO: 52.9 % — HIGH (ref 13–44)
MAGNESIUM SERPL-MCNC: 1.8 MG/DL — SIGNIFICANT CHANGE UP (ref 1.6–2.6)
MCHC RBC-ENTMCNC: 26.6 PG — LOW (ref 27–34)
MCHC RBC-ENTMCNC: 31.5 GM/DL — LOW (ref 32–36)
MCV RBC AUTO: 84.4 FL — SIGNIFICANT CHANGE UP (ref 80–100)
MONOCYTES # BLD AUTO: 0.37 K/UL — SIGNIFICANT CHANGE UP (ref 0–0.9)
MONOCYTES NFR BLD AUTO: 11.8 % — SIGNIFICANT CHANGE UP (ref 2–14)
NEUTROPHILS # BLD AUTO: 0.89 K/UL — LOW (ref 1.8–7.4)
NEUTROPHILS NFR BLD AUTO: 28.3 % — LOW (ref 43–77)
NRBC # BLD: 0 /100 WBCS — SIGNIFICANT CHANGE UP (ref 0–0)
NRBC # FLD: 0 K/UL — SIGNIFICANT CHANGE UP (ref 0–0)
PHOSPHATE SERPL-MCNC: 4.1 MG/DL — SIGNIFICANT CHANGE UP (ref 2.5–4.5)
PLATELET # BLD AUTO: 172 K/UL — SIGNIFICANT CHANGE UP (ref 150–400)
POTASSIUM SERPL-MCNC: 3.6 MMOL/L — SIGNIFICANT CHANGE UP (ref 3.5–5.3)
POTASSIUM SERPL-SCNC: 3.6 MMOL/L — SIGNIFICANT CHANGE UP (ref 3.5–5.3)
RBC # BLD: 4.81 M/UL — SIGNIFICANT CHANGE UP (ref 3.8–5.2)
RBC # FLD: 14.4 % — SIGNIFICANT CHANGE UP (ref 10.3–14.5)
SODIUM SERPL-SCNC: 139 MMOL/L — SIGNIFICANT CHANGE UP (ref 135–145)
TSH SERPL-MCNC: 0.41 UIU/ML — SIGNIFICANT CHANGE UP (ref 0.27–4.2)
TSH SERPL-MCNC: 0.42 UIU/ML — SIGNIFICANT CHANGE UP (ref 0.27–4.2)
WBC # BLD: 3.14 K/UL — LOW (ref 3.8–10.5)
WBC # FLD AUTO: 3.14 K/UL — LOW (ref 3.8–10.5)

## 2023-10-12 PROCEDURE — 99232 SBSQ HOSP IP/OBS MODERATE 35: CPT

## 2023-10-12 RX ORDER — ATORVASTATIN CALCIUM 80 MG/1
1 TABLET, FILM COATED ORAL
Qty: 0 | Refills: 0 | DISCHARGE
Start: 2023-10-12

## 2023-10-12 RX ORDER — ATORVASTATIN CALCIUM 80 MG/1
40 TABLET, FILM COATED ORAL AT BEDTIME
Refills: 0 | Status: DISCONTINUED | OUTPATIENT
Start: 2023-10-12 | End: 2023-10-13

## 2023-10-12 RX ORDER — LOSARTAN POTASSIUM 100 MG/1
25 TABLET, FILM COATED ORAL DAILY
Refills: 0 | Status: DISCONTINUED | OUTPATIENT
Start: 2023-10-12 | End: 2023-10-13

## 2023-10-12 RX ORDER — MECLIZINE HCL 12.5 MG
1 TABLET ORAL
Qty: 0 | Refills: 0 | DISCHARGE
Start: 2023-10-12

## 2023-10-12 RX ORDER — LOSARTAN POTASSIUM 100 MG/1
1 TABLET, FILM COATED ORAL
Qty: 0 | Refills: 0 | DISCHARGE
Start: 2023-10-12

## 2023-10-12 RX ADMIN — Medication 0: at 08:59

## 2023-10-12 RX ADMIN — LOSARTAN POTASSIUM 25 MILLIGRAM(S): 100 TABLET, FILM COATED ORAL at 12:17

## 2023-10-12 RX ADMIN — ATORVASTATIN CALCIUM 40 MILLIGRAM(S): 80 TABLET, FILM COATED ORAL at 21:58

## 2023-10-12 RX ADMIN — CARVEDILOL PHOSPHATE 12.5 MILLIGRAM(S): 80 CAPSULE, EXTENDED RELEASE ORAL at 17:38

## 2023-10-12 RX ADMIN — Medication 0: at 17:35

## 2023-10-12 RX ADMIN — CARVEDILOL PHOSPHATE 12.5 MILLIGRAM(S): 80 CAPSULE, EXTENDED RELEASE ORAL at 05:51

## 2023-10-12 NOTE — PROGRESS NOTE ADULT - PROBLEM SELECTOR PLAN 2
- noted to have multiple falls over the past year. Unclear about exact details   - CT head with parietal scalp hematoma   - PT eval recs home PT and RW
- noted to have multiple falls over the past year. Unclear about exact details   - CT head with parietal scalp hematoma   - PT eval recs home PT

## 2023-10-12 NOTE — DISCHARGE NOTE PROVIDER - CARE PROVIDER_API CALL
Beverly Petersen  Internal Medicine  [address] [facility], [address]  [place] [address]  Phone: [phone]  Fax: [phone]  Follow Up Time:

## 2023-10-12 NOTE — CHART NOTE - NSCHARTNOTEFT_GEN_A_CORE
Medicine PA Note    Pt requires a rolling walker at home due to their diagnosis of fall risk, HTN and dizziness to help complete her MRADL's.    Anil Chaudhry PASushilC  x [phone]

## 2023-10-12 NOTE — DIETITIAN INITIAL EVALUATION ADULT - ADD RECOMMEND
1. Please obtain current weight and height measurements during admission  2. Monitor weights, labs, BM's, skin integrity, p.o. intake.   3. Please monitor % PO intake on flowsheets   4. Honor food preferences as able within therapeutic diet order.

## 2023-10-12 NOTE — DIETITIAN INITIAL EVALUATION ADULT - DIET TYPE
Palliative Care follow-up  Pt's wife, Kimi, called. She is concerned and anxious about the pt's DC plan. She specifically asked about getting a hospital bed and  CPAP equipment at home. PC RN explained CM would be able to help with exploring what pt's insurance covers and getting provider orders for this equipment. PC RN will reach out to CM on behalf of pt.    Kimi also expressed that she is interested in [facility]iative [facility]. The pt has been offered this before, but has always refused. He told Kimi this morning that he would like to be on service with [facility]iative [facility]. PC RN to email OP Palliative Team.    Email from OP Palliative asked PC services to give [facility]iative [facility] Brochure to pt and wife so they can call them once he is home.       Updated: Barbara RAMIREZ and OP Palliative Team    Plan: Continue to follow and support pt with DC plans and GOC as needed. Give brochure to Kimi and Richard.     Thank you for allowing Palliative Care to support this patient and family. Contact [phone] for additional assistance, change in patient status, or with any questions/concerns.     Recommend liberalization of diet to promote PO intake/consistent carbohydrate (no snacks)

## 2023-10-12 NOTE — DIETITIAN INITIAL EVALUATION ADULT - PERTINENT MEDS FT
MEDICATIONS  (STANDING):  aspirin  chewable 81 milliGRAM(s) Oral daily  atorvastatin 40 milliGRAM(s) Oral at bedtime  carvedilol 12.5 milliGRAM(s) Oral every 12 hours  dextrose 5%. 1000 milliLiter(s) (50 mL/Hr) IV Continuous <Continuous>  dextrose 50% Injectable 25 Gram(s) IV Push once  enoxaparin Injectable 30 milliGRAM(s) SubCutaneous every 24 hours  glucagon  Injectable 1 milliGRAM(s) IntraMuscular once  hydrochlorothiazide 25 milliGRAM(s) Oral daily  insulin lispro (ADMELOG) corrective regimen sliding scale   SubCutaneous three times a day before meals  losartan 25 milliGRAM(s) Oral daily    MEDICATIONS  (PRN):  acetaminophen     Tablet .. 650 milliGRAM(s) Oral every 6 hours PRN Temp greater or equal to 38C (100.4F), Mild Pain (1 - 3)  aluminum hydroxide/magnesium hydroxide/simethicone Suspension 30 milliLiter(s) Oral every 4 hours PRN Dyspepsia  dextrose Oral Gel 15 Gram(s) Oral once PRN Blood Glucose LESS THAN 70 milliGRAM(s)/deciliter  meclizine 25 milliGRAM(s) Oral every 6 hours PRN Dizziness  melatonin 3 milliGRAM(s) Oral at bedtime PRN Insomnia  ondansetron Injectable 4 milliGRAM(s) IV Push every 8 hours PRN Nausea and/or Vomiting

## 2023-10-12 NOTE — DISCHARGE NOTE PROVIDER - HOSPITAL COURSE
89 y/o Female, with a PmHx of HTN, DM, Vertigo, Breast Ca in remission, frequent UTIs, Asthma, presenting with 1 hour of dizziness and pre-syncope.    Pt was noted to have multiple falls over the past year for an unclear reason. During this admission for a similar event, CT head and neck showed a mod to severe chronic microvascular ischemic diease and chronic infarcts in the thalamic region bilaterally that is grossly stable from 08/25/18. Echocardiogram done showed an EF of 47%, mild aortic stenosis and mild to mod aortic regurgitation. She was seen by Neurology and recommended to continue meclizine. Of note, she was found to be hypertensive with a blood pressure initially of 224/122 that could be have been a contributing factor to her syncope. Her home bp meds were adjusted and her bp is now controlled. Pt comfortable at this time and is now medically cleared for discharge home. Outpatient follow up.    ==> A nonspecific irregular nodular opacity in the right upper lobe  measures up to 6 x 5 x 1.4 cm and demonstrates linear opacities extending to the pleural surface.  Follow-up chest CT may be considered in 3  months to exclude interval change.  - Multiple thyroid nodules measure up to 2.2 cm. Outpatient follow up. 89 y/o Female, with a PmHx of HTN, DM, Vertigo, Breast Ca in remission, frequent UTIs, Asthma, presenting with 1 hour of dizziness and pre-syncope.    Pt was noted to have multiple falls over the past year for an unclear reason. During this admission for a similar event, CT head and neck showed a mod to severe chronic microvascular ischemic diease and chronic infarcts in the thalamic region bilaterally that is grossly stable from 08/25/18. Echocardiogram done showed an EF of 47%, mild aortic stenosis and mild to mod aortic regurgitation. She was seen by Neurology and recommended to continue meclizine. Of note, she was found to be hypertensive with a blood pressure initially of 224/122 that could be have been a contributing factor to her pre-syncope. Her home bp meds were restarted and losartan was added with significant improvement in BP. PT evaluated patient and recommended home PT. Pt comfortable at this time and is now medically cleared for discharge home. Outpatient follow up.    Incidental Findings:   - A nonspecific irregular nodular opacity in the right upper lobe  measures up to 6 x 5 x 1.4 cm and demonstrates linear opacities extending to the pleural surface.  Follow-up chest CT may be considered in 3  months to exclude interval change.  - Multiple thyroid nodules measure up to 2.2 cm. Outpatient follow up for thyroid ultrasound  --> Findings and recommendations for follow-up were discussed with patient and family.      89 y/o Female, with a PmHx of HTN, DM, Vertigo, Breast Ca in remission, frequent UTIs, Asthma, presenting with 1 hour of dizziness and pre-syncope.    Pt was noted to have multiple falls over the past year for an unclear reason. During this admission for a similar event, CT head and neck showed a mod to severe chronic microvascular ischemic diease and chronic infarcts in the thalamic region bilaterally that is grossly stable from 08/25/18. Echocardiogram done showed an EF of 47%, mild aortic stenosis and mild to mod aortic regurgitation. She was seen by Neurology and recommended to continue meclizine. Of note, she was found to be hypertensive with a blood pressure initially of 224/122 that could be have been a contributing factor to her pre-syncope. Her home bp meds were restarted and losartan was added with significant improvement in BP. PT evaluated patient and recommended home PT. Pt comfortable at this time and is now medically cleared for discharge home. Outpatient follow up.    Incidental Findings:   - A nonspecific irregular nodular opacity in the right upper lobe  measures up to 6 x 5 x 1.4 cm and demonstrates linear opacities extending to the pleural surface.  Follow-up chest CT may be considered in 3  months to exclude interval change.  - Multiple thyroid nodules measure up to 2.2 cm. Outpatient follow up for thyroid ultrasound  --> Findings and recommendations for follow-up were discussed with patient and family.

## 2023-10-12 NOTE — DIETITIAN INITIAL EVALUATION ADULT - FEEDING ASSISTANCE
Progress Note, Physician


Chief Complaint: 


AWAKE EATING WITH NURSE


DENIES CHEST PAIN OR SOB








- Current Medication List


Current Medications: 


Active Medications





Acetaminophen (Tylenol -)  650 mg PO Q4H PRN


   PRN Reason: FEVER OR PAIN


Aspirin (Asa -)  81 mg PO DAILY [facility]


   Last Admin: 05/08/17 09:56 Dose:  81 mg


Atorvastatin Calcium (Lipitor -)  20 mg PO HS [facility]


   Last Admin: 05/08/17 21:17 Dose:  20 mg


Carbidopa/Levodopa (Sinemet *Cr* 25/100 -)  2 combo PO 0700,1100,1500,1900 [facility]


   Last Admin: 05/08/17 18:45 Dose:  Not Given


Carvedilol (Coreg -)  3.125 mg PO BID [facility]


   Last Admin: 05/08/17 21:18 Dose:  3.125 mg


Furosemide (Lasix Injection -)  40 mg IVPUSH BID@0600,1400 [facility]


   Last Admin: 05/08/17 14:44 Dose:  40 mg


Gabapentin (Neurontin -)  100 mg PO TID [facility]


   Last Admin: 05/08/17 21:17 Dose:  100 mg


Heparin Sodium (Porcine) (Heparin -)  5,000 unit SQ BID [facility]


   Last Admin: 05/08/17 21:18 Dose:  5,000 unit


Insulin Aspart (Novolog Vial Sliding Scale -)  1 vial SQ ACHS [facility]


   PRN Reason: Protocol


   Last Admin: 05/08/17 21:25 Dose:  Not Given


Insulin Detemir (Levemir Vial)  25 units SQ ACBK [facility]


   Last Admin: 05/08/17 07:36 Dose:  Not Given


Non-Formulary Medication (Linaclotide [Linzess])  290 mcg PO DAILY [facility]


Non-Formulary Medication (Naloxegol Oxalate [Movantik])  25 mg PO DAILY [facility]


Polyethylene Glycol (Miralax (For Daily Use) -)  34 gm PO BID [facility]


   Last Admin: 05/08/17 09:57 Dose:  34 gm


Potassium Chloride (K-Dur -)  10 meq PO BID [facility]


   Last Admin: 05/08/17 21:17 Dose:  10 meq


Pramipexole Dihydrochloride (Mirapex -)  0.75 mg PO HS [facility]


   Last Admin: 05/07/17 22:25 Dose:  0.75 mg


Pramipexole Dihydrochloride (Mirapex -)  0.25 mg PO 0700,1100,1500,1900 [facility]


   Last Admin: 05/08/17 18:44 Dose:  Not Given


Ramipril (Altace -)  5 mg PO DAILY [facility]


   Last Admin: 05/08/17 09:55 Dose:  5 mg


Sitagliptin Phosphate (Januvia -)  100 mg PO ACBK [facility]


   Last Admin: 05/08/17 06:03 Dose:  100 mg











- Objective


Vital Signs: 


 Vital Signs











Temperature  96.3 F L  05/08/17 17:00


 


Pulse Rate  60   05/08/17 17:00


 


Respiratory Rate  20   05/08/17 17:00


 


Blood Pressure  120/64   05/08/17 17:00


 


O2 Sat by Pulse Oximetry (%)  98   05/08/17 10:00











Constitutional: Yes: No Distress


Eyes: Yes: WNL


HENT: Yes: WNL


Neck: Yes: WNL


Cardiovascular: Yes: WNL


Respiratory: Yes: On Nasal O2, SOB


Gastrointestinal: Yes: WNL


Genitourinary: Yes: WNL


Musculoskeletal: Yes: Muscle Weakness


Extremities: Yes: WNL


Edema: Yes


Edema: LLE: 2+, RLE: 2+


Peripheral Pulses WNL: Yes


Integumentary: Yes: WNL


Wound/Incision: Yes: Clean/Dry


Neurological: Yes: Pre-Existing Deficit, Unsteady Gait


...Motor Strength: LLE, RLE


Psychiatric: Yes: Other


Labs: 


 CBC, BMP





 05/06/17 05:35 





 05/08/17 05:35 











Problem List





- Problems


(1) Acute on chronic systolic (congestive) heart failure


Code(s): I50.23 - ACUTE ON CHRONIC SYSTOLIC (CONGESTIVE) HEART FAILURE





(2) CAD (coronary artery disease)


Code(s): I25.10 - ATHSCL HEART DISEASE OF NATIVE CORONARY ARTERY W/O ANG PCTRS 

  





(3) CHF (congestive heart failure)


Code(s): I50.9 - HEART FAILURE, UNSPECIFIED   Qualifiers: 


     Congestive heart failure type: systolic     Congestive heart failure 

chronicity: acute on chronic        Qualified Code(s): I50.23 - Acute on 

chronic systolic (congestive) heart failure  





(4) COPD (chronic obstructive pulmonary disease)


Code(s): J44.9 - CHRONIC OBSTRUCTIVE PULMONARY DISEASE, UNSPECIFIED   





(5) Diabetes


Code(s): E11.9 - TYPE 2 DIABETES MELLITUS WITHOUT COMPLICATIONS   Qualifiers: 


     Diabetes mellitus type: type 2





(6) History of permanent cardiac pacemaker placement


Code(s): Z95.0 - PRESENCE OF CARDIAC PACEMAKER





(7) Parkinson disease


Code(s): G20 - PARKINSON'S DISEASE





(8) Renal insufficiency


Code(s): N28.9 - DISORDER OF KIDNEY AND URETER, UNSPECIFIED








Assessment/Plan


02 SUPPORT


NC02 2L


IV DIURESIS


DAILY WEIGHTS


OOB TO CHAIR


[facility]


ACUTE ON CHRONIC SYSTOLIC CHF, CARDIO F/U APPRECIATED Please encourage PO intake, assist with meals and menu selections, provide alternatives PRN.

## 2023-10-12 NOTE — CHART NOTE - NSCHARTNOTEFT_GEN_A_CORE
chart reviewed this AM. Of note, LDL is 136; please start on Lipitor 80mg daily for stroke prevention. TTE completed, no actionable findings. Will sign off at this time. Call [phone] with any questions

## 2023-10-12 NOTE — PROGRESS NOTE ADULT - PROBLEM SELECTOR PLAN 3
- hypertensive to 224/122.   - son endorses med non-adherence   - C/w home coreg 12.5 BID and HCTZ 25 mg daily  - Add losartan today to optimize BP   - Likely chronically elevated BP, goal to slowly decrease ~SBP 140s-150s
- hypertensive to 224/122. Now improved without meds to 177/88  - son endorses med non-adherence   - C/w home coreg 12.5 BID and HCTZ 25 mg daily

## 2023-10-12 NOTE — DISCHARGE NOTE PROVIDER - ATTENDING DISCHARGE PHYSICAL EXAMINATION:
CONSTITUTIONAL: elderly female in NAD, well-developed, well-groomed  ENMT: Moist oral mucosa  RESPIRATORY: Normal respiratory effort; lungs are clear to auscultation bilaterally; No wheezes or rales  CARDIOVASCULAR: Regular rate and rhythm; Normal S1 and S2; No murmurs, rubs, or gallops; No lower extremity edema  ABDOMEN: Soft, Nontender, Nondistended; Bowel sounds present  PSYCH: AAOx3 (oriented to person, place, and time); affect appropriate  NEUROLOGY: no focal deficits     Patient seen and examined. Patient reports she feels well, ate her whole breakfast. Noted to have improvement in her blood pressure to 130s-140s today. Discussed with patient's niece Jessica [phone] and discussed plan for discharge today. CM assistance appreciated for home care. Patient's son to provide transport home.

## 2023-10-12 NOTE — DIETITIAN INITIAL EVALUATION ADULT - ORAL INTAKE PTA/DIET HISTORY
Patient reports no known food allergies or food intolerances. Does not take any nutrition supplements at home. Reports wearing an upper denture; denies any chewing or swallowing difficulties. Patient reports following a low salt diet at home. Patient reports she maintains a good appetite at home, typically would consume 2-3 meals daily. Expressed a strong preference for vegetables, particularly potatoes.    Current weight and height not documented for present admission.  Per patient, reports height as 65inches and usual body weight as 141lb. Reports weight loss x1 year to current weight of 127lb.  Bed scale weight obtained by RD during visit, 47kg/103lb.  [facility] noted the following weight history: 56.6kg (5/20)  Objective weight measurements suggest 9.6kg (17%) weight loss x5 months period of time (significant)

## 2023-10-12 NOTE — DIETITIAN INITIAL EVALUATION ADULT - NS FNS DIET ORDER
Diet, Regular:   Consistent Carbohydrate {Evening Snack} (CSTCHOSN)  DASH/TLC {Sodium & Cholesterol Restricted} (DASH) (10-10-23 @ 11:54)

## 2023-10-12 NOTE — PROGRESS NOTE ADULT - PROBLEM SELECTOR PLAN 6
DVT ppx: lovenox 30 mg daily   Diet: CC  Code status: per son, has not had discussion yet. Advised son to have discussion while patient is able. Currently full code.  Dispo: home once medically optimized
DVT ppx: lovenox 30 mg daily   Diet: CC  Dispo: home w/ home PT, anticipate in 24-48 hrs

## 2023-10-12 NOTE — DISCHARGE NOTE PROVIDER - NSDCCPCAREPLAN_GEN_ALL_CORE_FT
PRINCIPAL DISCHARGE DIAGNOSIS  Diagnosis: HTN (hypertension)  Assessment and Plan of Treatment:       SECONDARY DISCHARGE DIAGNOSES  Diagnosis: Incidental lung nodule  Assessment and Plan of Treatment: You were incidentally found to have a lung nodule in the right upper lobe. You are recommended to have a repeat CT scan of your chest in 3 months. Please follow-up with your primary care doctor.    Diagnosis: Thyroid nodule incidentally noted on imaging study  Assessment and Plan of Treatment: You were found to have multiple thyroid nodules measuring up to 2.2cm. You are recommended to have an outpatient thyroid ultrasound. Your TSH or thyroid hormone level is normal. Please follow-up with your primary care doctor to arrange for outpatient follow-up.     PRINCIPAL DISCHARGE DIAGNOSIS  Diagnosis: Hypertensive urgency  Assessment and Plan of Treatment: You were found to have very high blood pressure which was likely causing your dizziness. It is very important that you continue to take your blood pressure medications as prescribed. Please follow-up with your primary care doctor in 1 week for a blood pressure check.      SECONDARY DISCHARGE DIAGNOSES  Diagnosis: Incidental lung nodule  Assessment and Plan of Treatment: You were incidentally found to have a lung nodule in the right upper lobe. You are recommended to have a repeat CT scan of your chest in 3 months. Please follow-up with your primary care doctor.    Diagnosis: Thyroid nodule incidentally noted on imaging study  Assessment and Plan of Treatment: You were found to have multiple thyroid nodules measuring up to 2.2cm. You are recommended to have an outpatient thyroid ultrasound. Your TSH or thyroid hormone level is normal. Please follow-up with your primary care doctor to arrange for outpatient follow-up.    Diagnosis: Chronic cerebrovascular accident (CVA)  Assessment and Plan of Treatment: You had a CT head done which showed you had old strokes. Please continue to take your medications as prescribed. Aspirin and atorvastatin were added to your medication list to prevent further strokes.

## 2023-10-12 NOTE — PROGRESS NOTE ADULT - PROBLEM SELECTOR PLAN 1
- 1 hour of dizziness and pre-syncope while making food and in significant hypertension (224/122)  - noted history of presyncope and vertigo   - CT head and CTA head and neck with No CT evidence of acute intracranial pathology. 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 Patent cervical vasculature. No evidence of a major vessel occlusion, flow-limiting stenosis or aneurysm about the [place].  The dural venous sinuses and cavernous sinuses are patent.  - continue aspirin 81 mg daily (home med)  - TTE pending   - Monitor on Tele   - neurology consult appreciated, suspect this was related to hypertensive urgency.  - BP now improved, c/w home coreg and HCTZ. Suspect component of medication nonadherence at home
- 1 hour of dizziness and pre-syncope while making food and in significant hypertension (224/122)  - noted history of presyncope and vertigo   - CT head and CTA head and neck with No CT evidence of acute intracranial pathology. 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 Patent cervical vasculature. No evidence of a major vessel occlusion, flow-limiting stenosis or aneurysm about the [place].  The dural venous sinuses and cavernous sinuses are patent.  - continue aspirin 81 mg daily (home med)  - TTE showing left ventricular systolic function is mildly decreased with an ejection fraction of 47 %,global left ventricular hypokinesis, mild-mod AR.   - Monitor on Tele   - neurology consult appreciated, suspect this was related to hypertensive urgency.  - BP now improved however still above, c/w home coreg and HCTZ. Add losartan today to optimize BP   - Suspect component of medication nonadherence at home (family endorsing this)

## 2023-10-12 NOTE — DIETITIAN NUTRITION RISK NOTIFICATION - ADDITIONAL COMMENTS/DIETITIAN RECOMMENDATIONS
September 21, 2023     Patient: Chantell Flores   YOB: 1971   Date of Visit: 9/21/2023       To Whom it May Concern:    Chantell Flores was seen in my clinic on 9/21/2023 at 4:00 pm.    Please excuse Chantell for her absence from work on the date listed above to be able to make her appointment.  For may return to work on 9/25/2023 at full capacity.    Sincerely,         Shanta Li PA-C    Medical information is confidential and cannot be disclosed without the written consent of the patient or her representative.      
Please see Dietitian Initial Assessment for complete recommendations.     Christy Bee MS RDN CDN  On [employer] Teams, Pager #[phone]

## 2023-10-12 NOTE — PROGRESS NOTE ADULT - SUBJECTIVE AND OBJECTIVE BOX
[facility] Division of Hospital Medicine  Melissa Kerr MD  Available on [employer] TEAMS    SUBJECTIVE / OVERNIGHT EVENTS: Patient seen and examined. Reports that she feels fine. She denies headaches, dizziness, feeling faint. Denies chest pain or shortness of breath. Would like me to call her niece to update her.       MEDICATIONS  (STANDING):  aspirin  chewable 81 milliGRAM(s) Oral daily  atorvastatin 40 milliGRAM(s) Oral at bedtime  carvedilol 12.5 milliGRAM(s) Oral every 12 hours  dextrose 5%. 1000 milliLiter(s) (50 mL/Hr) IV Continuous <Continuous>  dextrose 50% Injectable 25 Gram(s) IV Push once  enoxaparin Injectable 30 milliGRAM(s) SubCutaneous every 24 hours  glucagon  Injectable 1 milliGRAM(s) IntraMuscular once  hydrochlorothiazide 25 milliGRAM(s) Oral daily  insulin lispro (ADMELOG) corrective regimen sliding scale   SubCutaneous three times a day before meals  losartan 25 milliGRAM(s) Oral daily    MEDICATIONS  (PRN):  acetaminophen     Tablet .. 650 milliGRAM(s) Oral every 6 hours PRN Temp greater or equal to 38C (100.4F), Mild Pain (1 - 3)  aluminum hydroxide/magnesium hydroxide/simethicone Suspension 30 milliLiter(s) Oral every 4 hours PRN Dyspepsia  dextrose Oral Gel 15 Gram(s) Oral once PRN Blood Glucose LESS THAN 70 milliGRAM(s)/deciliter  meclizine 25 milliGRAM(s) Oral every 6 hours PRN Dizziness  melatonin 3 milliGRAM(s) Oral at bedtime PRN Insomnia  ondansetron Injectable 4 milliGRAM(s) IV Push every 8 hours PRN Nausea and/or Vomiting      I&O's Summary      PHYSICAL EXAM:  Vital Signs Last 24 Hrs  T(C): 36.9 (12 Oct 2023 12:22), Max: 37 (11 Oct 2023 17:55)  T(F): 98.4 (12 Oct 2023 12:22), Max: 98.6 (11 Oct 2023 17:55)  HR: 78 (12 Oct 2023 12:22) (66 - 78)  BP: 160/68 (12 Oct 2023 12:22) (155/79 - 170/77)  BP(mean): 91 (12 Oct 2023 12:22) (91 - 91)  RR: 18 (12 Oct 2023 12:22) (17 - 18)  SpO2: 100% (12 Oct 2023 12:22) (96% - 100%)    Parameters below as of 12 Oct 2023 12:22  Patient On (Oxygen Delivery Method): room air      CONSTITUTIONAL: elderly female in NAD, well-developed, well-groomed  EYES: Conjunctiva and sclera clear  ENMT: Moist oral mucosa  RESPIRATORY: Normal respiratory effort; lungs are clear to auscultation bilaterally; No wheezes or rales  CARDIOVASCULAR: Regular rate and rhythm; Normal S1 and S2; No murmurs, rubs, or gallops; No lower extremity edema  ABDOMEN: Soft, Nontender, Nondistended; Bowel sounds present  PSYCH: AAOx3 (oriented to person, place, and time); affect appropriate  NEUROLOGY: no focal deficits   SKIN: No rashes; No palpable lesions    LABS:                        12.8   3.14  )-----------( 172      ( 12 Oct 2023 06:30 )             40.6     10-12    139  |  100  |  20  ----------------------------<  124<H>  3.6   |  29  |  0.82    Ca    9.2      12 Oct 2023 06:30  Phos  4.1     10-12  Mg     1.80     10-12    TPro  6.4  /  Alb  3.5  /  TBili  0.4  /  DBili  x   /  AST  19  /  ALT  7   /  AlkPhos  56  10-11          Urinalysis Basic - ( 12 Oct 2023 06:30 )    Color: x / Appearance: x / SG: x / pH: x  Gluc: 124 mg/dL / Ketone: x  / Bili: x / Urobili: x   Blood: x / Protein: x / Nitrite: x   Leuk Esterase: x / RBC: x / WBC x   Sq Epi: x / Non Sq Epi: x / Bacteria: x        Culture - Urine (collected 09 Oct 2023 23:10)  Source: Clean Catch Clean Catch (Midstream)  Final Report (11 Oct 2023 15:41):    <10,000 CFU/mL Normal Urogenital Taina    COMMUNICATION:  Care Discussed with Consultants/Other Providers and Details of Discussion:   Discussions with Patient/Family: Discussed with niana Lopez [phone] and julee Turner [phone] separately and updated regarding hospital course. Discussed need for outpatient CT chest and thyroid US with gee Lopez  PCP Communication:    
[facility] Division of Hospital Medicine  Melissa Kerr MD  Available on [employer] TEAMS    SUBJECTIVE / OVERNIGHT EVENTS: Patient seen and examined, niece and nephew at bedside. Patient reports she feels better and denies any dizziness, headaches, feeling off balance. Denies chest pain or shortness of breath. Per family, she lives alone currently and family lives very close by to help, and plan is for her move in with her son. They are concerned patient is not taking medication on a daily basis       MEDICATIONS  (STANDING):  aspirin  chewable 81 milliGRAM(s) Oral daily  carvedilol 12.5 milliGRAM(s) Oral every 12 hours  dextrose 5%. 1000 milliLiter(s) (50 mL/Hr) IV Continuous <Continuous>  dextrose 50% Injectable 25 Gram(s) IV Push once  enoxaparin Injectable 30 milliGRAM(s) SubCutaneous every 24 hours  glucagon  Injectable 1 milliGRAM(s) IntraMuscular once  insulin lispro (ADMELOG) corrective regimen sliding scale   SubCutaneous three times a day before meals    MEDICATIONS  (PRN):  acetaminophen     Tablet .. 650 milliGRAM(s) Oral every 6 hours PRN Temp greater or equal to 38C (100.4F), Mild Pain (1 - 3)  aluminum hydroxide/magnesium hydroxide/simethicone Suspension 30 milliLiter(s) Oral every 4 hours PRN Dyspepsia  dextrose Oral Gel 15 Gram(s) Oral once PRN Blood Glucose LESS THAN 70 milliGRAM(s)/deciliter  meclizine 25 milliGRAM(s) Oral every 6 hours PRN Dizziness  melatonin 3 milliGRAM(s) Oral at bedtime PRN Insomnia  ondansetron Injectable 4 milliGRAM(s) IV Push every 8 hours PRN Nausea and/or Vomiting      I&O's Summary      PHYSICAL EXAM:  Vital Signs Last 24 Hrs  T(C): 36.5 (11 Oct 2023 05:30), Max: 36.9 (10 Oct 2023 17:45)  T(F): 97.7 (11 Oct 2023 05:30), Max: 98.5 (10 Oct 2023 17:45)  HR: 62 (11 Oct 2023 05:30) (62 - 72)  BP: 156/70 (11 Oct 2023 05:30) (147/80 - 159/82)  BP(mean): --  RR: 17 (11 Oct 2023 05:30) (17 - 18)  SpO2: 97% (11 Oct 2023 05:30) (97% - 98%)    Parameters below as of 11 Oct 2023 05:30  Patient On (Oxygen Delivery Method): room air      CONSTITUTIONAL: NAD, well-developed, well-groomed  EYES: Conjunctiva and sclera clear  ENMT: Moist oral mucosa  RESPIRATORY: Normal respiratory effort; lungs are clear to auscultation bilaterally; No wheezes or rales  CARDIOVASCULAR: Regular rate and rhythm; Normal S1 and S2; No murmurs, rubs, or gallops; No lower extremity edema  ABDOMEN: Soft, Nontender, Nondistended; Bowel sounds present  MUSCULOSKELETAL:  No clubbing or cyanosis of digits; No joint swelling or tenderness to palpation  PSYCH: AAOx3 (oriented to person, place, and time); affect appropriate  NEUROLOGY: no focal deficits   SKIN: No rashes; No palpable lesions    LABS:                        12.8   3.02  )-----------( 167      ( 11 Oct 2023 06:11 )             40.9     10-11    141  |  103  |  16  ----------------------------<  130<H>  3.9   |  28  |  0.88    Ca    9.4      11 Oct 2023 06:11    TPro  6.4  /  Alb  3.5  /  TBili  0.4  /  DBili  x   /  AST  19  /  ALT  7   /  AlkPhos  56  10-11    PT/INR - ( 09 Oct 2023 23:16 )   PT: 11.1 sec;   INR: 0.98 ratio         PTT - ( 09 Oct 2023 23:16 )  PTT:36.4 sec      Urinalysis Basic - ( 11 Oct 2023 06:11 )    Color: x / Appearance: x / SG: x / pH: x  Gluc: 130 mg/dL / Ketone: x  / Bili: x / Urobili: x   Blood: x / Protein: x / Nitrite: x   Leuk Esterase: x / RBC: x / WBC x   Sq Epi: x / Non Sq Epi: x / Bacteria: x        Culture - Urine (collected 09 Oct 2023 23:10)  Source: Clean Catch Clean Catch (Midstream)  Final Report (11 Oct 2023 15:41):    <10,000 CFU/mL Normal Urogenital Taina

## 2023-10-12 NOTE — DIETITIAN INITIAL EVALUATION ADULT - PERTINENT LABORATORY DATA
10-12    139  |  100  |  20  ----------------------------<  124<H>  3.6   |  29  |  0.82    Ca    9.2      12 Oct 2023 06:30  Phos  4.1     10-12  Mg     1.80     10-12    TPro  6.4  /  Alb  3.5  /  TBili  0.4  /  DBili  x   /  AST  19  /  ALT  7   /  AlkPhos  56  10-11  POCT Blood Glucose.: 200 mg/dL (10-12-23 @ 11:58)  A1C with Estimated Average Glucose Result: 6.4 % (10-11-23 @ 06:11)  A1C with Estimated Average Glucose Result: 7.0 % (05-19-23 @ 06:34)

## 2023-10-12 NOTE — PROGRESS NOTE ADULT - PROBLEM SELECTOR PLAN 4
- hold metformin 500 daily   - POC checks   - CC diet   - SSI
- hold metformin 500 daily   - POC checks   - CC diet   - SSI

## 2023-10-12 NOTE — PROGRESS NOTE ADULT - ASSESSMENT
89F with PMH HTN, DM, vertigo, breast cancer in remission, frequent UTIs, asthma presenting with 1 hour of dizziness and pre-syncope prior to admission, admitted with hypertensive urgency.

## 2023-10-12 NOTE — PROGRESS NOTE ADULT - NUTRITIONAL ASSESSMENT
This patient has been assessed with a concern for Malnutrition and has been determined to have a diagnosis/diagnoses of Severe protein-calorie malnutrition and Underweight (BMI < 19).    This patient is being managed with:   Diet Regular-  Consistent Carbohydrate {Evening Snack} (CSTCHOSN)  DASH/TLC {Sodium & Cholesterol Restricted} (DASH)  Entered: Oct 10 2023 11:52AM

## 2023-10-12 NOTE — DISCHARGE NOTE PROVIDER - DETAILS OF MALNUTRITION DIAGNOSIS/DIAGNOSES
This patient has been assessed with a concern for Malnutrition and was treated during this hospitalization for the following Nutrition diagnosis/diagnoses:     -  10/12/2023: Severe protein-calorie malnutrition   -  10/12/2023: Underweight (BMI < 19)

## 2023-10-12 NOTE — DIETITIAN INITIAL EVALUATION ADULT - PROBLEM SELECTOR PLAN 1
- 1 hour of dizziness and pre-syncope while making food and in significant hypertension (224/122)  - noted history of presyncope and vertigo   - CT head and CTA head and neck with No CT evidence of acute intracranial pathology. 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 Patent cervical vasculature. No evidence of a major vessel occlusion, flow-limiting stenosis or aneurysm about the [place].  The dural venous sinuses and cavernous sinuses are patent.  - continue aspirin 81 mg daily   - meclizine 25 mg q6 prn for dizziness   - TTE  - Tele   - orthostatics   -neurology consult

## 2023-10-12 NOTE — DISCHARGE NOTE NURSING/CASE MANAGEMENT/SOCIAL WORK - PATIENT PORTAL LINK FT
You can access the FollowMyHealth Patient Portal offered by [facility] by registering at the following website: [email or web address]. By joining [employer]’s FollowMyHealth portal, you will also be able to view your health information using other applications (apps) compatible with our system.

## 2023-10-12 NOTE — DISCHARGE NOTE PROVIDER - NSDCMRMEDTOKEN_GEN_ALL_CORE_FT
aspirin 81 mg oral tablet: 1 tab(s) orally once a day  Coreg 12.5 mg oral tablet: 1 tab(s) orally 2 times a day  HYDROCHLOROTHIAZIDE 25MGTABLETS: TAKE 1 TABLET BY MOUTH EVERY DAY IN THE MORNING  MECLIZINE 25MG RX TABLETS: TAKE 1 TABLET BY MOUTH THREE TIMES DAILY AS NEEDED  METFORMIN 500MG TABLETS: TAKE 1 TABLET BY MOUTH DAILY WITH DINNER   aspirin 81 mg oral tablet: 1 tab(s) orally once a day  atorvastatin 40 mg oral tablet: 1 tab(s) orally once a day (at bedtime)  Coreg 12.5 mg oral tablet: 1 tab(s) orally 2 times a day  HYDROCHLOROTHIAZIDE 25MGTABLETS: TAKE 1 TABLET BY MOUTH EVERY DAY IN THE MORNING  losartan 25 mg oral tablet: 1 tab(s) orally once a day  meclizine 25 mg oral tablet: 1 tab(s) orally every 6 hours As needed Dizziness  METFORMIN 500MG TABLETS: TAKE 1 TABLET BY MOUTH DAILY WITH DINNER   aspirin 81 mg oral tablet: 1 tab(s) orally once a day  atorvastatin 40 mg oral tablet: 1 tab(s) orally once a day (at bedtime)  Coreg 12.5 mg oral tablet: 1 tab(s) orally 2 times a day  hydroCHLOROthiazide 25 mg oral tablet: 1 tab(s) orally once a day  losartan 25 mg oral tablet: 1 tab(s) orally once a day  meclizine 25 mg oral tablet: 1 tab(s) orally every 6 hours as needed for Dizziness  metFORMIN 500 mg oral tablet: 1 tab(s) orally once a day with dinner

## 2023-10-12 NOTE — DISCHARGE NOTE PROVIDER - NSDCCPTREATMENT_GEN_ALL_CORE_FT
Plan of care reviewed.    PRINCIPAL PROCEDURE  Procedure: CT head and neck  Findings and Treatment: IMPRESSION:  NONCONTRAST HEAD CT SCAN:  1.  No CT evidence of acute intracranial pathology.  2.  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  3.  There is a right parietal scalp hematoma.  No evidence of acute   intracranial hemorrhage, subdural collection or calvarial fracture.  CT ANGIOGRAPHY NECK:  1.  Patent cervical vasculature.  No hemodynamically significant carotid   stenosis or flow-limiting vertebral artery stenosis.  No carotid or   vertebral dissection.  2.  Focal tortuosity and ectasia of the distal cervical internal carotid   artery with hairpin turns.  Mild tortuosity of the mid cervical internal   carotid artery.  The findings are likely related to underlying systemic   hypertension.  3.  A nonspecific irregular nodular opacity in the right upper lobe   measures up to 6 x 5 x 1.4 cm and demonstrates linear opacities extending   to the pleural surface.  If there are no prior outside hospital imaging   studies to document long-term stability, a follow-up chest CT may be   considered in 3  months to exclude interval change.  4.  Multiple thyroid nodules measure up to 2.2 cm.  If clinically   warranted these may be further evaluated with a nonemergent ultrasound.  CT ANGIOGRAPHY BRAIN: No evidence of a major vessel occlusion,   flow-limiting stenosis or aneurysm about the [place].  Normal   anatomic variants as discussed above.

## 2023-10-12 NOTE — DIETITIAN INITIAL EVALUATION ADULT - REASON FOR ADMISSION
Dizziness and giddiness    Patient is a 89y Female with PMH diabetes mellitus, HTN, breast cancer who presented to [facility] with 1 hour of dizziness and pre-syncope prior to admission.

## 2023-10-12 NOTE — DIETITIAN INITIAL EVALUATION ADULT - OTHER INFO
Patient is currently ordered for a PO diet. Patient reports a poor appetite and PO intake at meals during course of admission secondary to dislike of food in hospital. Patient deferred writer's offer of an oral nutrition supplement. Food preferences discussed, adjustments made via CBORD. Writer encouraged patient to complete daily menu selections to help promote PO intake. Patient denies any chewing or swallowing difficulty on the current diet order. No report of GI distress (nausea, vomiting, diarrhea, constipation). No BMs during admission per patient report. No BMs noted per RN flowsheet documentation. Not noted to be on a bowel regimen. Noted HbA1c 6.4% (10/11/23).    Writer provided verbal education regarding current diet order and nutrition recommendations for after discharge, including a low salt, low sugar dietary pattern. Patient verbalized understanding to the discussion.

## 2023-10-12 NOTE — PROGRESS NOTE ADULT - PROBLEM SELECTOR PLAN 5
- "A nonspecific irregular nodular opacity in the right upper lobe  measures up to 6 x 5 x 1.4 cm and demonstrates linear opacities extending to the pleural surface.  Follow-up chest CT may be considered in 3  months to exclude interval change."   - "Multiple thyroid nodules measure up to 2.2 cm.  If clinically warranted these may be further evaluated with a nonemergent ultrasound as OP"  --> discussed results with patient's family and need for outpatient follow-up for CT chest and thyroid US, they are in agreement   - Check TSH in AM for thyroid nodules
- "A nonspecific irregular nodular opacity in the right upper lobe  measures up to 6 x 5 x 1.4 cm and demonstrates linear opacities extending to the pleural surface.  Follow-up chest CT may be considered in 3  months to exclude interval change."   - "Multiple thyroid nodules measure up to 2.2 cm.  If clinically warranted these may be further evaluated with a nonemergent ultrasound as OP"  --> discussed results with patient's family and need for outpatient follow-up for CT chest and thyroid US, they are in agreement   - Check TSH in AM for thyroid nodules

## 2023-10-13 VITALS
RESPIRATION RATE: 17 BRPM | SYSTOLIC BLOOD PRESSURE: 136 MMHG | OXYGEN SATURATION: 100 % | DIASTOLIC BLOOD PRESSURE: 74 MMHG | HEART RATE: 80 BPM | TEMPERATURE: 98 F

## 2023-10-13 LAB
GLUCOSE BLDC GLUCOMTR-MCNC: 127 MG/DL — HIGH (ref 70–99)
GLUCOSE BLDC GLUCOMTR-MCNC: 134 MG/DL — HIGH (ref 70–99)
GLUCOSE BLDC GLUCOMTR-MCNC: 166 MG/DL — HIGH (ref 70–99)

## 2023-10-13 PROCEDURE — 99239 HOSP IP/OBS DSCHRG MGMT >30: CPT

## 2023-10-13 RX ORDER — CARVEDILOL PHOSPHATE 80 MG/1
1 CAPSULE, EXTENDED RELEASE ORAL
Refills: 0 | DISCHARGE

## 2023-10-13 RX ORDER — LOSARTAN POTASSIUM 100 MG/1
1 TABLET, FILM COATED ORAL
Qty: 30 | Refills: 0
Start: 2023-10-13 | End: 2023-11-11

## 2023-10-13 RX ORDER — ATORVASTATIN CALCIUM 80 MG/1
1 TABLET, FILM COATED ORAL
Qty: 30 | Refills: 0
Start: 2023-10-13 | End: 2023-11-11

## 2023-10-13 RX ORDER — MECLIZINE HCL 12.5 MG
1 TABLET ORAL
Qty: 120 | Refills: 0
Start: 2023-10-13 | End: 2023-11-11

## 2023-10-13 RX ORDER — ASPIRIN/CALCIUM CARB/MAGNESIUM 324 MG
1 TABLET ORAL
Qty: 30 | Refills: 0
Start: 2023-10-13 | End: 2023-11-11

## 2023-10-13 RX ORDER — METFORMIN HYDROCHLORIDE 850 MG/1
1 TABLET ORAL
Qty: 30 | Refills: 0
Start: 2023-10-13 | End: 2023-11-11

## 2023-10-13 RX ORDER — METFORMIN HYDROCHLORIDE 850 MG/1
1 TABLET ORAL
Qty: 0 | Refills: 0 | DISCHARGE

## 2023-10-13 RX ORDER — HYDROCHLOROTHIAZIDE 25 MG
1 TABLET ORAL
Qty: 30 | Refills: 0
Start: 2023-10-13 | End: 2023-11-11

## 2023-10-13 RX ORDER — HYDROCHLOROTHIAZIDE 25 MG
1 TABLET ORAL
Qty: 0 | Refills: 3 | DISCHARGE

## 2023-10-13 RX ORDER — CARVEDILOL PHOSPHATE 80 MG/1
1 CAPSULE, EXTENDED RELEASE ORAL
Qty: 60 | Refills: 0
Start: 2023-10-13 | End: 2023-11-11

## 2023-10-13 RX ORDER — ASPIRIN/CALCIUM CARB/MAGNESIUM 324 MG
1 TABLET ORAL
Refills: 0 | DISCHARGE

## 2023-10-13 RX ADMIN — Medication 25 MILLIGRAM(S): at 17:19

## 2023-10-13 RX ADMIN — Medication 81 MILLIGRAM(S): at 11:26

## 2023-10-13 RX ADMIN — Medication 1: at 12:52

## 2023-10-13 RX ADMIN — LOSARTAN POTASSIUM 25 MILLIGRAM(S): 100 TABLET, FILM COATED ORAL at 05:35

## 2023-10-13 RX ADMIN — CARVEDILOL PHOSPHATE 12.5 MILLIGRAM(S): 80 CAPSULE, EXTENDED RELEASE ORAL at 05:35

## 2023-10-13 RX ADMIN — ENOXAPARIN SODIUM 30 MILLIGRAM(S): 100 INJECTION SUBCUTANEOUS at 12:04

## 2023-10-13 NOTE — CHART NOTE - NSCHARTNOTEFT_GEN_A_CORE
Patient seen and examined. Please see discharge note for full physical examination and discharge details. Patient is stable for discharge today.     Melissa Kerr MD  Hospitalist  Pager #: [phone]

## 2024-04-10 ENCOUNTER — INPATIENT (INPATIENT)
Facility: HOSPITAL | Age: 89
LOS: 3 days | Discharge: HOME HEALTH | DRG: 312 | End: 2024-04-14
Attending: INTERNAL MEDICINE | Admitting: INTERNAL MEDICINE
Payer: MEDICARE

## 2024-04-10 VITALS
HEIGHT: 66 IN | TEMPERATURE: 98 F | DIASTOLIC BLOOD PRESSURE: 80 MMHG | WEIGHT: 130.07 LBS | HEART RATE: 87 BPM | OXYGEN SATURATION: 98 % | RESPIRATION RATE: 20 BRPM | SYSTOLIC BLOOD PRESSURE: 173 MMHG

## 2024-04-10 DIAGNOSIS — Z98.890 OTHER SPECIFIED POSTPROCEDURAL STATES: Chronic | ICD-10-CM

## 2024-04-10 DIAGNOSIS — R55 SYNCOPE AND COLLAPSE: ICD-10-CM

## 2024-04-10 LAB
ALBUMIN SERPL ELPH-MCNC: 3.9 G/DL — SIGNIFICANT CHANGE UP (ref 3.3–5)
ALP SERPL-CCNC: 68 U/L — SIGNIFICANT CHANGE UP (ref 40–120)
ALT FLD-CCNC: 8 U/L — LOW (ref 10–45)
ANION GAP SERPL CALC-SCNC: 16 MMOL/L — SIGNIFICANT CHANGE UP (ref 5–17)
APPEARANCE UR: CLEAR — SIGNIFICANT CHANGE UP
APTT BLD: 31.6 SEC — SIGNIFICANT CHANGE UP (ref 24.5–35.6)
AST SERPL-CCNC: 24 U/L — SIGNIFICANT CHANGE UP (ref 10–40)
BACTERIA # UR AUTO: NEGATIVE /HPF — SIGNIFICANT CHANGE UP
BASE EXCESS BLDV CALC-SCNC: 5.6 MMOL/L — HIGH (ref -2–3)
BASOPHILS # BLD AUTO: 0.04 K/UL — SIGNIFICANT CHANGE UP (ref 0–0.2)
BASOPHILS NFR BLD AUTO: 0.9 % — SIGNIFICANT CHANGE UP (ref 0–2)
BILIRUB SERPL-MCNC: 0.5 MG/DL — SIGNIFICANT CHANGE UP (ref 0.2–1.2)
BILIRUB UR-MCNC: NEGATIVE — SIGNIFICANT CHANGE UP
BUN SERPL-MCNC: 17 MG/DL — SIGNIFICANT CHANGE UP (ref 7–23)
CA-I SERPL-SCNC: 1.23 MMOL/L — SIGNIFICANT CHANGE UP (ref 1.15–1.33)
CALCIUM SERPL-MCNC: 9.4 MG/DL — SIGNIFICANT CHANGE UP (ref 8.4–10.5)
CAST: 0 /LPF — SIGNIFICANT CHANGE UP (ref 0–4)
CHLORIDE BLDV-SCNC: 98 MMOL/L — SIGNIFICANT CHANGE UP (ref 96–108)
CHLORIDE SERPL-SCNC: 99 MMOL/L — SIGNIFICANT CHANGE UP (ref 96–108)
CO2 BLDV-SCNC: 35 MMOL/L — HIGH (ref 22–26)
CO2 SERPL-SCNC: 23 MMOL/L — SIGNIFICANT CHANGE UP (ref 22–31)
COLOR SPEC: YELLOW — SIGNIFICANT CHANGE UP
CREAT SERPL-MCNC: 0.62 MG/DL — SIGNIFICANT CHANGE UP (ref 0.5–1.3)
DIFF PNL FLD: NEGATIVE — SIGNIFICANT CHANGE UP
EGFR: 83 ML/MIN/1.73M2 — SIGNIFICANT CHANGE UP
EOSINOPHIL # BLD AUTO: 0.15 K/UL — SIGNIFICANT CHANGE UP (ref 0–0.5)
EOSINOPHIL NFR BLD AUTO: 3.3 % — SIGNIFICANT CHANGE UP (ref 0–6)
FLUAV AG NPH QL: SIGNIFICANT CHANGE UP
FLUBV AG NPH QL: SIGNIFICANT CHANGE UP
GAS PNL BLDV: 134 MMOL/L — LOW (ref 136–145)
GAS PNL BLDV: SIGNIFICANT CHANGE UP
GAS PNL BLDV: SIGNIFICANT CHANGE UP
GLUCOSE BLDC GLUCOMTR-MCNC: 95 MG/DL — SIGNIFICANT CHANGE UP (ref 70–99)
GLUCOSE BLDV-MCNC: 130 MG/DL — HIGH (ref 70–99)
GLUCOSE SERPL-MCNC: 123 MG/DL — HIGH (ref 70–99)
GLUCOSE UR QL: NEGATIVE MG/DL — SIGNIFICANT CHANGE UP
HCO3 BLDV-SCNC: 33 MMOL/L — HIGH (ref 22–29)
HCT VFR BLD CALC: 42.9 % — SIGNIFICANT CHANGE UP (ref 34.5–45)
HCT VFR BLDA CALC: 41 % — SIGNIFICANT CHANGE UP (ref 34.5–46.5)
HGB BLD CALC-MCNC: 13.6 G/DL — SIGNIFICANT CHANGE UP (ref 11.7–16.1)
HGB BLD-MCNC: 13.7 G/DL — SIGNIFICANT CHANGE UP (ref 11.5–15.5)
INR BLD: 1.09 RATIO — SIGNIFICANT CHANGE UP (ref 0.85–1.18)
KETONES UR-MCNC: NEGATIVE MG/DL — SIGNIFICANT CHANGE UP
LACTATE BLDV-MCNC: 1 MMOL/L — SIGNIFICANT CHANGE UP (ref 0.5–2)
LACTATE BLDV-MCNC: 2.2 MMOL/L — HIGH (ref 0.5–2)
LEUKOCYTE ESTERASE UR-ACNC: NEGATIVE — SIGNIFICANT CHANGE UP
LYMPHOCYTES # BLD AUTO: 1.77 K/UL — SIGNIFICANT CHANGE UP (ref 1–3.3)
LYMPHOCYTES # BLD AUTO: 39.4 % — SIGNIFICANT CHANGE UP (ref 13–44)
MAGNESIUM SERPL-MCNC: 1.7 MG/DL — SIGNIFICANT CHANGE UP (ref 1.6–2.6)
MCHC RBC-ENTMCNC: 26.7 PG — LOW (ref 27–34)
MCHC RBC-ENTMCNC: 31.9 GM/DL — LOW (ref 32–36)
MCV RBC AUTO: 83.5 FL — SIGNIFICANT CHANGE UP (ref 80–100)
MONOCYTES # BLD AUTO: 0.48 K/UL — SIGNIFICANT CHANGE UP (ref 0–0.9)
MONOCYTES NFR BLD AUTO: 10.7 % — SIGNIFICANT CHANGE UP (ref 2–14)
NEUTROPHILS # BLD AUTO: 2.05 K/UL — SIGNIFICANT CHANGE UP (ref 1.8–7.4)
NEUTROPHILS NFR BLD AUTO: 45.7 % — SIGNIFICANT CHANGE UP (ref 43–77)
NITRITE UR-MCNC: NEGATIVE — SIGNIFICANT CHANGE UP
NRBC # BLD: 0 /100 WBCS — SIGNIFICANT CHANGE UP (ref 0–0)
NT-PROBNP SERPL-SCNC: 405 PG/ML — HIGH (ref 0–300)
PCO2 BLDV: 58 MMHG — HIGH (ref 39–42)
PH BLDV: 7.36 — SIGNIFICANT CHANGE UP (ref 7.32–7.43)
PH UR: 8 — SIGNIFICANT CHANGE UP (ref 5–8)
PHOSPHATE SERPL-MCNC: 3.2 MG/DL — SIGNIFICANT CHANGE UP (ref 2.5–4.5)
PLATELET # BLD AUTO: 176 K/UL — SIGNIFICANT CHANGE UP (ref 150–400)
PO2 BLDV: 31 MMHG — SIGNIFICANT CHANGE UP (ref 25–45)
POTASSIUM BLDV-SCNC: 4.2 MMOL/L — SIGNIFICANT CHANGE UP (ref 3.5–5.1)
POTASSIUM SERPL-MCNC: 4.8 MMOL/L — SIGNIFICANT CHANGE UP (ref 3.5–5.3)
POTASSIUM SERPL-SCNC: 4.8 MMOL/L — SIGNIFICANT CHANGE UP (ref 3.5–5.3)
PROT SERPL-MCNC: 7 G/DL — SIGNIFICANT CHANGE UP (ref 6–8.3)
PROT UR-MCNC: NEGATIVE MG/DL — SIGNIFICANT CHANGE UP
PROTHROM AB SERPL-ACNC: 11.4 SEC — SIGNIFICANT CHANGE UP (ref 9.5–13)
RBC # BLD: 5.14 M/UL — SIGNIFICANT CHANGE UP (ref 3.8–5.2)
RBC # FLD: 14.7 % — HIGH (ref 10.3–14.5)
RBC CASTS # UR COMP ASSIST: 8 /HPF — HIGH (ref 0–4)
REVIEW: SIGNIFICANT CHANGE UP
RSV RNA NPH QL NAA+NON-PROBE: SIGNIFICANT CHANGE UP
SAO2 % BLDV: 45.6 % — LOW (ref 67–88)
SARS-COV-2 RNA SPEC QL NAA+PROBE: SIGNIFICANT CHANGE UP
SODIUM SERPL-SCNC: 138 MMOL/L — SIGNIFICANT CHANGE UP (ref 135–145)
SP GR SPEC: 1.01 — SIGNIFICANT CHANGE UP (ref 1–1.03)
SQUAMOUS # UR AUTO: 0 /HPF — SIGNIFICANT CHANGE UP (ref 0–5)
TROPONIN T, HIGH SENSITIVITY RESULT: 21 NG/L — SIGNIFICANT CHANGE UP (ref 0–51)
UROBILINOGEN FLD QL: 0.2 MG/DL — SIGNIFICANT CHANGE UP (ref 0.2–1)
WBC # BLD: 4.49 K/UL — SIGNIFICANT CHANGE UP (ref 3.8–10.5)
WBC # FLD AUTO: 4.49 K/UL — SIGNIFICANT CHANGE UP (ref 3.8–10.5)
WBC UR QL: 0 /HPF — SIGNIFICANT CHANGE UP (ref 0–5)

## 2024-04-10 PROCEDURE — 99285 EMERGENCY DEPT VISIT HI MDM: CPT | Mod: FS

## 2024-04-10 PROCEDURE — 71045 X-RAY EXAM CHEST 1 VIEW: CPT | Mod: 26

## 2024-04-10 PROCEDURE — 70450 CT HEAD/BRAIN W/O DYE: CPT | Mod: 26,MC

## 2024-04-10 RX ORDER — SODIUM CHLORIDE 9 MG/ML
500 INJECTION INTRAMUSCULAR; INTRAVENOUS; SUBCUTANEOUS ONCE
Refills: 0 | Status: COMPLETED | OUTPATIENT
Start: 2024-04-10 | End: 2024-04-10

## 2024-04-10 RX ORDER — MAGNESIUM SULFATE 500 MG/ML
1 VIAL (ML) INJECTION ONCE
Refills: 0 | Status: COMPLETED | OUTPATIENT
Start: 2024-04-10 | End: 2024-04-10

## 2024-04-10 RX ADMIN — Medication 100 GRAM(S): at 18:28

## 2024-04-10 RX ADMIN — SODIUM CHLORIDE 1000 MILLILITER(S): 9 INJECTION INTRAMUSCULAR; INTRAVENOUS; SUBCUTANEOUS at 18:29

## 2024-04-10 NOTE — H&P ADULT - HISTORY OF PRESENT ILLNESS
History obtained from patients niece Sofiya     89 F with pmhx htn, dm, Breast cancer s/p Radiation went to the doctors office to accompany her family and almost passed out today. Associated with Dizziness.   Patient noticed swelling of her Ankles over the past few weeks.   No hx loss of consciousness.  No hx chest pain/ palpitations/ SOB.

## 2024-04-10 NOTE — H&P ADULT - ASSESSMENT
89 F with pmhx htn, dm, Breast cancer s/p Radiation went to the doctors office to accompany her family and almost passed out today. Associated with Dizziness.     # Near Syncope r/o cardiac   CT head No evidence of extra-axial collection, acute hemorrhage or mass effect.    Check Orthostatics   Follow up Echo       # HTN Hold Amlodipine.       # DM type 2 HISS   Follow up A1C

## 2024-04-10 NOTE — ED ADULT NURSE NOTE - NSFALLRISKINTERV_ED_ALL_ED

## 2024-04-10 NOTE — ED PROVIDER NOTE - ATTENDING APP SHARED VISIT CONTRIBUTION OF CARE
Attending MD Dalton:   I personally have seen and examined this patient.  Physician assistant note reviewed and agree on plan of care and except where noted.  See below for details.     Seen in [place]    89F with PMH/PSH including HTN, DM (no previous visits, no info in HIE, no family member at bedside, no additional phone numbers available) presents to the ED with bilateral LE edema.  Reports no previous episode, denies being on water pill/diuretic. Attending MD Dalton:   I personally have seen and examined this patient.  Physician assistant note reviewed and agree on plan of care and except where noted.  See below for details.     Seen in [place] 12    89F with PMH/PSH including HTN, DM (no previous visits, no info in HIE, no family member at bedside, reports family should be here shortly) presents to the ED with bilateral LE edema.  Reports no previous episode, denies being on water pill/diuretic.  Does not remember meds.  Reports earlier felt very weak, like she was going to pass out, denies associated chest pain, shortness of breath, abdominal pain.  Denies recent illness, vomiting, diarrhea, bloody or black stools, dysuria, hematuria.  Denies subjective fevers.  Reports headache, denies sudden change in vision, denies worst headache of life.  Denies trauma, falls.      Exam:   General: thin, urinated in wheelchair in route from triage  HENT: head NCAT, airway patent  Eyes: no conjunctival injection   Lungs: lungs CTAB with good inspiratory effort, no wheezing, no rhonchi, no rales  Cardiac: +S1S2, no obvious r/g, +1/6 murmur, +2 pitting edema to bilateral LEs, no calf tenderness, erythema or warmth   GI: abdomen soft with +BS, NT, ND  : no CVAT  MSK: ranging neck and extremities freely  Neuro: moving all extremities spontaneously, nonfocal  Skin: no rash noted    A/P: 89F with weakness, will evaluate for but not limited to HF, metabolic derangement, occult infection such as PNA, UTI, viral URI, given vague symptoms will also obtain CTH to eval for ICH, will obtain EKG and place on monitor for arrhythmia, will likely need admission Attending MD Dalton:   I personally have seen and examined this patient.  Physician assistant note reviewed and agree on plan of care and except where noted.  See below for details.     Seen in [place] 12    89F with PMH/PSH including HTN, DM (no previous visits, no info in HIE, no family member at bedside, reports family should be here shortly) presents to the ED with bilateral LE edema.  Reports no previous episode, denies being on water pill/diuretic.  Does not remember meds.  Reports earlier felt very weak, like she was going to pass out, denies associated chest pain, shortness of breath, abdominal pain.  Denies recent illness, vomiting, diarrhea, bloody or black stools, dysuria, hematuria.  Denies subjective fevers.  Reports headache, denies sudden change in vision, denies worst headache of life.  Denies trauma, falls.      Exam:   General: thin, urinated in wheelchair in route from triage  HENT: head NCAT, airway patent  Eyes: no conjunctival injection   Lungs: lungs CTAB with good inspiratory effort, no wheezing, no rhonchi, no rales  Cardiac: +S1S2, no obvious r/g, +1/6 murmur, +2 pitting edema to bilateral LEs, no calf tenderness, erythema or warmth   GI: abdomen soft with +BS, NT, ND  : no CVAT  MSK: ranging neck and extremities freely  Neuro: moving all extremities spontaneously, nonfocal  Skin: no rash noted    A/P: 89F with weakness, near syncope, will evaluate for but not limited to HF, metabolic derangement, occult infection such as PNA, UTI, viral URI, given vague symptoms will also obtain CTH to eval for ICH, will obtain EKG and place on monitor for arrhythmia, will likely need admission

## 2024-04-10 NOTE — H&P ADULT - MUSCULOSKELETAL
ROM intact/strength 5/5 bilateral upper extremities/strength 5/5 bilateral lower extremities/no chest wall tenderness

## 2024-04-10 NOTE — ED ADULT NURSE NOTE - OBJECTIVE STATEMENT
patient received alert & oriented x3, via wheelchair. Complaining of increasing swelling to Both lower legs, ankles & feet. Denies fever, sob, cough or pain. GCS-15. Stated having a 4 hours HHA at home.

## 2024-04-10 NOTE — ED PROVIDER NOTE - PROGRESS NOTE DETAILS
CT head and chest x-ray nonactionable.  EKG with mildly prolonged QTc.  proBNP 400.  Patient has no O2 requirement at this time, no hypoxia.  Patient initially had reported syncope versus near syncope episode.  Given prolonged QTc and borderline low magnesium, given mag IV.  Will admit on telemetry - Tahir Francisco PA-C

## 2024-04-10 NOTE — ED PROVIDER NOTE - OBJECTIVE STATEMENT
89-year-old female history HTN, diabetes brought in for self-reported episode of near syncope today.  Patient also reports generalized headache.  Patient also notes worsening bilateral lower extremity edema.  Patient is unsure if she was on a diuretic for his history of CHF but is alert and oriented X.3.  Patient denies chest pain, nausea, vomiting, or abdominal pain, dizziness.  limited additional ros

## 2024-04-11 LAB
CULTURE RESULTS: SIGNIFICANT CHANGE UP
GLUCOSE BLDC GLUCOMTR-MCNC: 178 MG/DL — HIGH (ref 70–99)
GLUCOSE BLDC GLUCOMTR-MCNC: 185 MG/DL — HIGH (ref 70–99)
GLUCOSE BLDC GLUCOMTR-MCNC: 79 MG/DL — SIGNIFICANT CHANGE UP (ref 70–99)
GLUCOSE BLDC GLUCOMTR-MCNC: 89 MG/DL — SIGNIFICANT CHANGE UP (ref 70–99)
SPECIMEN SOURCE: SIGNIFICANT CHANGE UP

## 2024-04-11 PROCEDURE — 70553 MRI BRAIN STEM W/O & W/DYE: CPT | Mod: 26

## 2024-04-11 PROCEDURE — 93306 TTE W/DOPPLER COMPLETE: CPT | Mod: 26

## 2024-04-11 RX ORDER — DEXTROSE 10 % IN WATER 10 %
125 INTRAVENOUS SOLUTION INTRAVENOUS ONCE
Refills: 0 | Status: DISCONTINUED | OUTPATIENT
Start: 2024-04-11 | End: 2024-04-14

## 2024-04-11 RX ORDER — ASPIRIN/CALCIUM CARB/MAGNESIUM 324 MG
81 TABLET ORAL DAILY
Refills: 0 | Status: DISCONTINUED | OUTPATIENT
Start: 2024-04-11 | End: 2024-04-14

## 2024-04-11 RX ORDER — DEXTROSE 50 % IN WATER 50 %
25 SYRINGE (ML) INTRAVENOUS ONCE
Refills: 0 | Status: DISCONTINUED | OUTPATIENT
Start: 2024-04-11 | End: 2024-04-14

## 2024-04-11 RX ORDER — CARVEDILOL PHOSPHATE 80 MG/1
25 CAPSULE, EXTENDED RELEASE ORAL EVERY 12 HOURS
Refills: 0 | Status: DISCONTINUED | OUTPATIENT
Start: 2024-04-11 | End: 2024-04-12

## 2024-04-11 RX ORDER — SODIUM CHLORIDE 9 MG/ML
1000 INJECTION, SOLUTION INTRAVENOUS
Refills: 0 | Status: DISCONTINUED | OUTPATIENT
Start: 2024-04-11 | End: 2024-04-14

## 2024-04-11 RX ORDER — GLUCAGON INJECTION, SOLUTION 0.5 MG/.1ML
1 INJECTION, SOLUTION SUBCUTANEOUS ONCE
Refills: 0 | Status: DISCONTINUED | OUTPATIENT
Start: 2024-04-11 | End: 2024-04-14

## 2024-04-11 RX ORDER — MECLIZINE HCL 12.5 MG
25 TABLET ORAL THREE TIMES A DAY
Refills: 0 | Status: DISCONTINUED | OUTPATIENT
Start: 2024-04-11 | End: 2024-04-12

## 2024-04-11 RX ORDER — INSULIN LISPRO 100/ML
VIAL (ML) SUBCUTANEOUS AT BEDTIME
Refills: 0 | Status: DISCONTINUED | OUTPATIENT
Start: 2024-04-11 | End: 2024-04-14

## 2024-04-11 RX ORDER — HYDRALAZINE HCL 50 MG
50 TABLET ORAL THREE TIMES A DAY
Refills: 0 | Status: DISCONTINUED | OUTPATIENT
Start: 2024-04-11 | End: 2024-04-12

## 2024-04-11 RX ORDER — DEXTROSE 50 % IN WATER 50 %
15 SYRINGE (ML) INTRAVENOUS ONCE
Refills: 0 | Status: DISCONTINUED | OUTPATIENT
Start: 2024-04-11 | End: 2024-04-14

## 2024-04-11 RX ORDER — INSULIN LISPRO 100/ML
VIAL (ML) SUBCUTANEOUS
Refills: 0 | Status: DISCONTINUED | OUTPATIENT
Start: 2024-04-11 | End: 2024-04-14

## 2024-04-11 RX ORDER — DEXTROSE 50 % IN WATER 50 %
12.5 SYRINGE (ML) INTRAVENOUS ONCE
Refills: 0 | Status: DISCONTINUED | OUTPATIENT
Start: 2024-04-11 | End: 2024-04-14

## 2024-04-11 RX ADMIN — Medication 1: at 18:07

## 2024-04-11 RX ADMIN — CARVEDILOL PHOSPHATE 25 MILLIGRAM(S): 80 CAPSULE, EXTENDED RELEASE ORAL at 12:53

## 2024-04-11 RX ADMIN — Medication 81 MILLIGRAM(S): at 12:32

## 2024-04-11 RX ADMIN — CARVEDILOL PHOSPHATE 25 MILLIGRAM(S): 80 CAPSULE, EXTENDED RELEASE ORAL at 18:06

## 2024-04-11 RX ADMIN — Medication 50 MILLIGRAM(S): at 21:15

## 2024-04-11 RX ADMIN — Medication 50 MILLIGRAM(S): at 12:32

## 2024-04-11 RX ADMIN — Medication 25 MILLIGRAM(S): at 15:45

## 2024-04-11 NOTE — PHARMACOTHERAPY INTERVENTION NOTE - COMMENTS
Confirmed home medications with patient and pharmacy, updated in Outpatient Medication Review.    Home Medications:  aspirin 81 mg oral delayed release tablet: 1 tab(s) orally once a day  carvedilol 25 mg oral tablet: 1 tab(s) orally 2 times a day - see internal note  hydrALAZINE 50 mg oral tablet: 1 tab(s) orally 3 times a day  hydroCHLOROthiazide 25 mg oral tablet: 1 tab(s) orally once a day  meclizine 25 mg oral tablet: 1 tab(s) orally 3 times a day  metFORMIN 500 mg oral tablet: 1 tab(s) orally 2 times a day    Carli FloresD  Transitions of Care Pharmacist  Available on [employer] Teams  Spectra #[phone]

## 2024-04-11 NOTE — PATIENT PROFILE ADULT - FUNCTIONAL ASSESSMENT - BASIC MOBILITY 1.
3 = A little assistance Graft Donor Site Bandage (Optional-Leave Blank If You Don't Want In Note): Steri-strips and a pressure bandage were applied to the donor site.

## 2024-04-11 NOTE — PHYSICAL THERAPY INITIAL EVALUATION ADULT - ADDITIONAL COMMENTS
Pt lives with family in a PH +1 step to enter, first floor set up. Pt typically ambulates with a cane in the house and a RW outside the house. Pt has a walk in shower with a shower chair.

## 2024-04-11 NOTE — PATIENT PROFILE ADULT - FALL HARM RISK - RISK INTERVENTIONS

## 2024-04-11 NOTE — CONSULT NOTE ADULT - SUBJECTIVE AND OBJECTIVE BOX
Saeid Briseno MD  Interventional Cardiology / Endovascular Specialist  [place] Office : [phone] [place] [address] [place] [address]  Tel:   [place] Office : [address] [facility] [place] [address]  Tel: [phone]        HISTORY OF PRESENTING ILLNESS:  89 F with pmhx htn, dm, Breast cancer s/p Radiation went to the doctors office to accompany her family and almost passed out today. Associated with Dizziness.   Patient noticed swelling of her Ankles over the past few weeks.   No hx loss of consciousness.  No hx chest pain/ palpitations/ SOB.    PAST MEDICAL & SURGICAL HISTORY:  DM (diabetes mellitus), type 2      Benign essential HTN      No significant past surgical history          SOCIAL HISTORY: Substance Use (street drugs): ( x ) never used  (  ) other:    FAMILY HISTORY:      REVIEW OF SYSTEMS:  CONSTITUTIONAL: No fever, weight loss, or fatigue  EYES: No eye pain, visual disturbances, or discharge  ENMT:  No difficulty hearing, tinnitus, vertigo; No sinus or throat pain  BREASTS: No pain, masses, or nipple discharge  GASTROINTESTINAL: No abdominal or epigastric pain. No nausea, vomiting, or hematemesis; No diarrhea or constipation. No melena or hematochezia.  GENITOURINARY: No dysuria, frequency, hematuria, or incontinence  NEUROLOGICAL: No headaches, memory loss, loss of strength, numbness, or tremors  ENDOCRINE: No heat or cold intolerance; No hair loss  MUSCULOSKELETAL: No joint pain or swelling; No muscle, back, or extremity pain  PSYCHIATRIC: No depression, anxiety, mood swings, or difficulty sleeping  HEME/LYMPH: No easy bruising, or bleeding gums  All others negative    MEDICATIONS:  aspirin enteric coated 81 milliGRAM(s) Oral daily  carvedilol 25 milliGRAM(s) Oral every 12 hours  hydrALAZINE 50 milliGRAM(s) Oral three times a day        meclizine 25 milliGRAM(s) Oral three times a day PRN      dextrose 50% Injectable 25 Gram(s) IV Push once  dextrose 50% Injectable 12.5 Gram(s) IV Push once  dextrose Oral Gel 15 Gram(s) Oral once PRN  glucagon  Injectable 1 milliGRAM(s) IntraMuscular once  insulin lispro (ADMELOG) corrective regimen sliding scale   SubCutaneous at bedtime  insulin lispro (ADMELOG) corrective regimen sliding scale   SubCutaneous three times a day before meals    dextrose 10% Bolus 125 milliLiter(s) IV Bolus once  dextrose 5%. 1000 milliLiter(s) IV Continuous <Continuous>  dextrose 5%. 1000 milliLiter(s) IV Continuous <Continuous>      FAMILY HISTORY:        Allergies    codeine (Rash)    Intolerances    	      PHYSICAL EXAM:  T(C): 36.7 (04-11-24 @ 20:35), Max: 37.2 (04-11-24 @ 05:03)  HR: 71 (04-11-24 @ 20:35) (67 - 72)  BP: 132/68 (04-11-24 @ 20:35) (132/68 - 188/61)  RR: 17 (04-11-24 @ 20:35) (16 - 18)  SpO2: 95% (04-11-24 @ 20:35) (95% - 95%)  Wt(kg): --  I&O's Summary    11 Apr 2024 07:01  -  11 Apr 2024 23:39  --------------------------------------------------------  IN: 60 mL / OUT: 1 mL / NET: 59 mL        GENERAL: NAD  EYES: conjunctiva and sclera clear  ENMT: No tonsillar erythema, exudates, or enlargement  Cardiovascular: Normal S1 S2, No JVD, No murmurs, No edema  Respiratory: Lungs clear to auscultation	  Gastrointestinal:  Soft, Non-tender, + BS	  Extremities: No edema      LABS:	 	    CARDIAC MARKERS:                                  13.7   4.49  )-----------( 176      ( 10 Apr 2024 16:17 )             42.9     04-10    138  |  99  |  17  ----------------------------<  123<H>  4.8   |  23  |  0.62    Ca    9.4      10 Apr 2024 16:17  Phos  3.2     04-10  Mg     1.7     04-10    TPro  7.0  /  Alb  3.9  /  TBili  0.5  /  DBili  x   /  AST  24  /  ALT  8<L>  /  AlkPhos  68  04-10    proBNP:   Lipid Profile:   HgA1c:   TSH:     Consultant(s) Notes Reviewed:  [x ] YES  [ ] NO    Care Discussed with Consultants/Other Providers [ x] YES  [ ] NO    Imaging Personally Reviewed independently:  [x] YES  [ ] NO    All labs, radiologic studies, vitals, orders and medications list reviewed. Patient is seen and examined at bedside. Case discussed with medical team.    ASSESSMENT/PLAN:

## 2024-04-11 NOTE — CONSULT NOTE ADULT - ASSESSMENT
EKG SR RBBB, LAD    Echo < from: TTE W or WO Ultrasound Enhancing Agent (04.11.24 @ 10:34) >   1. Left ventricular cavity is normal in size. Left ventricular systolic function is normal. There are no regional wall motion abnormalities seen.    < end of copied text >    Assessment and Plan     1) Pre-syncope : Denies LOC echo normal , MRI with no acute infarct , monitor on tele get orthostatics     2) HTN : c/w coreg , HCTZ    3) Vertigo c/w meclizine

## 2024-04-11 NOTE — PHYSICAL THERAPY INITIAL EVALUATION ADULT - PERTINENT HX OF CURRENT PROBLEM, REHAB EVAL
Pt is a 89 y/o female admitted s/p presyncopal event. PMH: HTN, DM, Breast cancer s/p Radiation. Pt presents after presyncopal event. Pt noticed swelling of her ankles over the past few weeks.     CXR (-). CT head demonstrates No evidence of extra-axial collection, acute hemorrhage or mass effect. Additional findings described in detail above.

## 2024-04-11 NOTE — PROGRESS NOTE ADULT - ASSESSMENT
89 F with pmhx htn, dm, Breast cancer s/p Radiation went to the doctors office to accompany her family and almost passed out today. Associated with Dizziness.     # Near Syncope r/o cardiac   CT head No evidence of extra-axial collection, acute hemorrhage or mass effect.    Check Orthostatics neg   Follow up Echo reviwed     MRI Brain       # HTN Hold Amlodipine.       # DM type 2 HISS   Follow up A1C

## 2024-04-12 LAB
A1C WITH ESTIMATED AVERAGE GLUCOSE RESULT: 6.4 % — HIGH (ref 4–5.6)
ANION GAP SERPL CALC-SCNC: 11 MMOL/L — SIGNIFICANT CHANGE UP (ref 5–17)
BUN SERPL-MCNC: 18 MG/DL — SIGNIFICANT CHANGE UP (ref 7–23)
CALCIUM SERPL-MCNC: 9.1 MG/DL — SIGNIFICANT CHANGE UP (ref 8.4–10.5)
CHLORIDE SERPL-SCNC: 101 MMOL/L — SIGNIFICANT CHANGE UP (ref 96–108)
CO2 SERPL-SCNC: 28 MMOL/L — SIGNIFICANT CHANGE UP (ref 22–31)
CREAT SERPL-MCNC: 0.74 MG/DL — SIGNIFICANT CHANGE UP (ref 0.5–1.3)
EGFR: 75 ML/MIN/1.73M2 — SIGNIFICANT CHANGE UP
ESTIMATED AVERAGE GLUCOSE: 137 MG/DL — HIGH (ref 68–114)
GLUCOSE BLDC GLUCOMTR-MCNC: 112 MG/DL — HIGH (ref 70–99)
GLUCOSE BLDC GLUCOMTR-MCNC: 128 MG/DL — HIGH (ref 70–99)
GLUCOSE BLDC GLUCOMTR-MCNC: 160 MG/DL — HIGH (ref 70–99)
GLUCOSE BLDC GLUCOMTR-MCNC: 176 MG/DL — HIGH (ref 70–99)
GLUCOSE BLDC GLUCOMTR-MCNC: 250 MG/DL — HIGH (ref 70–99)
GLUCOSE SERPL-MCNC: 166 MG/DL — HIGH (ref 70–99)
MAGNESIUM SERPL-MCNC: 1.7 MG/DL — SIGNIFICANT CHANGE UP (ref 1.6–2.6)
PHOSPHATE SERPL-MCNC: 3.1 MG/DL — SIGNIFICANT CHANGE UP (ref 2.5–4.5)
POTASSIUM SERPL-MCNC: 3.8 MMOL/L — SIGNIFICANT CHANGE UP (ref 3.5–5.3)
POTASSIUM SERPL-SCNC: 3.8 MMOL/L — SIGNIFICANT CHANGE UP (ref 3.5–5.3)
SODIUM SERPL-SCNC: 140 MMOL/L — SIGNIFICANT CHANGE UP (ref 135–145)

## 2024-04-12 RX ORDER — MAGNESIUM SULFATE 500 MG/ML
2 VIAL (ML) INJECTION ONCE
Refills: 0 | Status: DISCONTINUED | OUTPATIENT
Start: 2024-04-12 | End: 2024-04-12

## 2024-04-12 RX ORDER — HYDRALAZINE HCL 50 MG
100 TABLET ORAL THREE TIMES A DAY
Refills: 0 | Status: DISCONTINUED | OUTPATIENT
Start: 2024-04-12 | End: 2024-04-14

## 2024-04-12 RX ORDER — METOPROLOL TARTRATE 50 MG
1 TABLET ORAL
Qty: 60 | Refills: 0
Start: 2024-04-12 | End: 2024-05-11

## 2024-04-12 RX ORDER — HYDRALAZINE HCL 50 MG
1 TABLET ORAL
Qty: 90 | Refills: 0
Start: 2024-04-12 | End: 2024-05-11

## 2024-04-12 RX ORDER — MECLIZINE HCL 12.5 MG
25 TABLET ORAL THREE TIMES A DAY
Refills: 0 | Status: DISCONTINUED | OUTPATIENT
Start: 2024-04-12 | End: 2024-04-14

## 2024-04-12 RX ORDER — MAGNESIUM SULFATE 500 MG/ML
1 VIAL (ML) INJECTION ONCE
Refills: 0 | Status: COMPLETED | OUTPATIENT
Start: 2024-04-12 | End: 2024-04-12

## 2024-04-12 RX ORDER — METOPROLOL TARTRATE 50 MG
100 TABLET ORAL
Refills: 0 | Status: DISCONTINUED | OUTPATIENT
Start: 2024-04-12 | End: 2024-04-14

## 2024-04-12 RX ORDER — POTASSIUM CHLORIDE 20 MEQ
20 PACKET (EA) ORAL ONCE
Refills: 0 | Status: COMPLETED | OUTPATIENT
Start: 2024-04-12 | End: 2024-04-12

## 2024-04-12 RX ADMIN — Medication 1: at 19:06

## 2024-04-12 RX ADMIN — Medication 100 MILLIGRAM(S): at 13:58

## 2024-04-12 RX ADMIN — Medication 25 MILLIGRAM(S): at 11:24

## 2024-04-12 RX ADMIN — Medication 50 MILLIGRAM(S): at 05:17

## 2024-04-12 RX ADMIN — Medication 100 MILLIGRAM(S): at 21:15

## 2024-04-12 RX ADMIN — Medication 100 GRAM(S): at 18:56

## 2024-04-12 RX ADMIN — Medication 81 MILLIGRAM(S): at 13:52

## 2024-04-12 RX ADMIN — Medication 25 MILLIGRAM(S): at 21:15

## 2024-04-12 RX ADMIN — Medication 100 MILLIGRAM(S): at 18:55

## 2024-04-12 RX ADMIN — CARVEDILOL PHOSPHATE 25 MILLIGRAM(S): 80 CAPSULE, EXTENDED RELEASE ORAL at 05:17

## 2024-04-12 RX ADMIN — Medication 20 MILLIEQUIVALENT(S): at 18:56

## 2024-04-12 NOTE — DISCHARGE NOTE PROVIDER - NSDCMRMEDTOKEN_GEN_ALL_CORE_FT
aspirin 81 mg oral delayed release tablet: 1 tab(s) orally once a day  carvedilol 25 mg oral tablet: 1 tab(s) orally 2 times a day - see internal note  hydrALAZINE 50 mg oral tablet: 1 tab(s) orally 3 times a day  hydroCHLOROthiazide 25 mg oral tablet: 1 tab(s) orally once a day  meclizine 25 mg oral tablet: 1 tab(s) orally 3 times a day  metFORMIN 500 mg oral tablet: 1 tab(s) orally 2 times a day   aspirin 81 mg oral delayed release tablet: 1 tab(s) orally once a day  hydrALAZINE 100 mg oral tablet: 1 tab(s) orally 3 times a day  meclizine 25 mg oral tablet: 1 tab(s) orally 3 times a day  metFORMIN 500 mg oral tablet: 1 tab(s) orally 2 times a day  metoprolol tartrate 100 mg oral tablet: 1 tab(s) orally 2 times a day

## 2024-04-12 NOTE — PROGRESS NOTE ADULT - ASSESSMENT
EKG SR RBBB, LAD    Echo < from: TTE W or WO Ultrasound Enhancing Agent (04.11.24 @ 10:34) >   1. Left ventricular cavity is normal in size. Left ventricular systolic function is normal. There are no regional wall motion abnormalities seen.    < end of copied text >    Assessment and Plan     1) Pre-syncope : Denies LOC echo normal , MRI with no acute infarct ,  tele with NSVT switch carvedilol to metoprolol 100 milliGRAM(s) BID     2) HTN : switch coreg to metoprolol as above , c/w hydralazine can increase to 100 milliGRAM(s) TID if blood pressure remain elevated    3) Vertigo c/w meclizine

## 2024-04-12 NOTE — DISCHARGE NOTE PROVIDER - NSDCCPCAREPLAN_GEN_ALL_CORE_FT
PRINCIPAL DISCHARGE DIAGNOSIS  Diagnosis: Near syncope  Assessment and Plan of Treatment: you were admitted with near sycope  you had cardiology work up including ECHO which was normal  you were evaluated by cardiology      SECONDARY DISCHARGE DIAGNOSES  Diagnosis: Hypertension  Assessment and Plan of Treatment: continue current medications    Diagnosis: Vertigo  Assessment and Plan of Treatment: continue meclizine     PRINCIPAL DISCHARGE DIAGNOSIS  Diagnosis: Near syncope  Assessment and Plan of Treatment: you were admitted with near sycope  you had cardiology work up including ECHO which was normal  you were evaluated by cardiology      SECONDARY DISCHARGE DIAGNOSES  Diagnosis: Hypertension  Assessment and Plan of Treatment: Continue current medications.  Continue to monitor your blood pressure.  Low salt diet  Activity as tolerated.  Take all medication as prescribed.  Follow up with your medical doctor for routine blood pressure monitoring at your next visit.  Notify your doctor if you have any of the following symptoms:   Dizziness, Lightheadedness, Blurry vision, Headache, Chest pain, Shortness of breath    Diagnosis: Vertigo  Assessment and Plan of Treatment: continue meclizine  Follow up with your PCP within 1-2 weeks from discharge.     PRINCIPAL DISCHARGE DIAGNOSIS  Diagnosis: Near syncope  Assessment and Plan of Treatment: you were admitted with near sycope  you had cardiology work up including ECHO which was normal  you were evaluated by cardiology      SECONDARY DISCHARGE DIAGNOSES  Diagnosis: Hypertension  Assessment and Plan of Treatment: Continue current medications.  Continue to monitor your blood pressure.  Low salt diet  Activity as tolerated.  Take all medication as prescribed.  Follow up with your medical doctor for routine blood pressure monitoring at your next visit.  Notify your doctor if you have any of the following symptoms:   Dizziness, Lightheadedness, Blurry vision, Headache, Chest pain, Shortness of breath    Diagnosis: Vertigo  Assessment and Plan of Treatment: Continue meclizine as prescribed. Improved.   Please follow-up with your primary care physician within one week of discharge.

## 2024-04-12 NOTE — DISCHARGE NOTE PROVIDER - PROVIDER TOKENS
PROVIDER:[TOKEN:[[id number]],FOLLOWUP:[1 week]],FREE:[LAST:[Finn],FIRST:[Saeid I],PHONE:[phone]],FAX:[(   )    -],ADDRESS:[[address] [place] [address]]] FREE:[LAST:[Finn],FIRST:[Saeid AGUILA],PHONE:[phone]],FAX:[(   )    -],ADDRESS:[[address] [place] [address]],FOLLOWUP:[1 week]]

## 2024-04-12 NOTE — DISCHARGE NOTE PROVIDER - NSDCHHATTENDCERT_GEN_ALL_CORE
Patient here today for EKG test.    
My signature below certifies that the above stated patient is homebound and upon completion of the Face-To-Face encounter, has the need for intermittent skilled nursing, physical therapy and/or speech or occupational therapy services in their home for their current diagnosis as outlined in their initial plan of care. These services will continue to be monitored by myself or another physician.

## 2024-04-12 NOTE — DISCHARGE NOTE PROVIDER - NSFOLLOWUPCLINICS_GEN_ALL_ED_FT
[facility] General Internal Medicine  General Internal Medicine  [address] [id number]  Phone: [phone]  Fax:   Follow Up Time: 2 weeks

## 2024-04-12 NOTE — DISCHARGE NOTE PROVIDER - NSDCHHASSISTILLNESS_GEN_ALL_CORE
----- Message from Alverto Cha MD sent at 4/21/2017  8:27 PM EDT -----  Regarding: please call pt  Paulina Meier - please call patient. Echo 4/19/17 - mild LVH, LVEF 65-70%    Echo looks OK.     Thanks,  SK fall risk

## 2024-04-12 NOTE — DISCHARGE NOTE PROVIDER - HOSPITAL COURSE
HPI:  History obtained from patients niece Sofiya     89 F with pmhx htn, dm, Breast cancer s/p Radiation went to the doctors office to accompany her family and almost passed out today. Associated with Dizziness.   Patient noticed swelling of her Ankles over the past few weeks.   No hx loss of consciousness.  No hx chest pain/ palpitations/ SOB.  (10 Apr 2024 19:35)    Hospital Course:  EKG SR RBBB, LAD    Echo < from: TTE W or WO Ultrasound Enhancing Agent (04.11.24 @ 10:34) >   1. Left ventricular cavity is normal in size. Left ventricular systolic function is normal. There are no regional wall motion abnormalities seen.    < end of copied text >    Assessment and Plan     1) Pre-syncope : Denies LOC echo normal , MRI with no acute infarct , monitored on tele. pt not orthostatic.     2) HTN : c/w coreg , HCTZ    3) Vertigo c/w meclizine    Medically cleared for discharge Home           Important Medication Changes and Reason:    Active or Pending Issues Requiring Follow-up:  > pcp follow up for vertigo  Advanced Directives:   [ x] Full code  [ ] DNR  [ ] Hospice    Discharge Diagnoses:  Dizziness/pre syncope  vertigo  HTN         HPI:  History obtained from patients niece Sofiya     89 F with pmhx htn, dm, Breast cancer s/p Radiation went to the doctors office to accompany her family and almost passed out today. Associated with Dizziness.   Patient noticed swelling of her Ankles over the past few weeks.   No hx loss of consciousness.  No hx chest pain/ palpitations/ SOB.  (10 Apr 2024 19:35)    Hospital Course:  EKG SR RBBB, LAD    Echo < from: TTE W or WO Ultrasound Enhancing Agent (04.11.24 @ 10:34) >   1. Left ventricular cavity is normal in size. Left ventricular systolic function is normal. There are no regional wall motion abnormalities seen.    < end of copied text >    Assessment and Plan     1) Pre-syncope : Denies LOC echo normal , Trop neg, CTH neg, MRI with no acute infarct , monitored on tele. pt not orthostatic.     2) HTN : chanaged to metoprolol    3) Vertigo c/w meclizine    Medically cleared for discharge Home           Important Medication Changes and Reason:  dc hydrochlorothiazide  changed to metoprolol    Active or Pending Issues Requiring Follow-up:  > pcp follow up for vertigo    Advanced Directives:   [ x] Full code  [ ] DNR  [ ] Hospice    Discharge Diagnoses:  Dizziness/pre syncope  vertigo  HTN

## 2024-04-12 NOTE — DISCHARGE NOTE NURSING/CASE MANAGEMENT/SOCIAL WORK - PATIENT PORTAL LINK FT
You can access the FollowMyHealth Patient Portal offered by [facility] by registering at the following website: [email or web address]. By joining [facility]’s FollowMyHealth portal, you will also be able to view your health information using other applications (apps) compatible with our system.

## 2024-04-12 NOTE — DISCHARGE NOTE PROVIDER - CARE PROVIDER_API CALL
CHARY BRISENO  Follow Up Time: 1 week    Chary Briseno  [address] [place] [address], [place] [address]  Phone: [phone]  Fax: (   )    -  Follow Up Time:    Saeid Briseno  [address] [place], [place] [address]  Phone: [phone]  Fax: (   )    -  Follow Up Time: 1 week

## 2024-04-13 LAB
GLUCOSE BLDC GLUCOMTR-MCNC: 126 MG/DL — HIGH (ref 70–99)
GLUCOSE BLDC GLUCOMTR-MCNC: 140 MG/DL — HIGH (ref 70–99)
GLUCOSE BLDC GLUCOMTR-MCNC: 156 MG/DL — HIGH (ref 70–99)
GLUCOSE BLDC GLUCOMTR-MCNC: 162 MG/DL — HIGH (ref 70–99)
GLUCOSE BLDC GLUCOMTR-MCNC: 98 MG/DL — SIGNIFICANT CHANGE UP (ref 70–99)

## 2024-04-13 RX ADMIN — Medication 25 MILLIGRAM(S): at 19:21

## 2024-04-13 RX ADMIN — Medication 81 MILLIGRAM(S): at 13:43

## 2024-04-13 RX ADMIN — Medication 100 MILLIGRAM(S): at 21:10

## 2024-04-13 RX ADMIN — Medication 100 MILLIGRAM(S): at 17:18

## 2024-04-13 RX ADMIN — Medication 25 MILLIGRAM(S): at 05:25

## 2024-04-13 RX ADMIN — Medication 25 MILLIGRAM(S): at 13:43

## 2024-04-13 RX ADMIN — Medication 1: at 13:00

## 2024-04-13 RX ADMIN — Medication 100 MILLIGRAM(S): at 05:25

## 2024-04-13 RX ADMIN — Medication 100 MILLIGRAM(S): at 13:43

## 2024-04-13 NOTE — PROGRESS NOTE ADULT - SUBJECTIVE AND OBJECTIVE BOX
Patient is a 89y old  Female who presents with a chief complaint of     SUBJECTIVE / OVERNIGHT EVENTS: Patient reports Dizziness     MEDICATIONS  (STANDING):  aspirin enteric coated 81 milliGRAM(s) Oral daily  carvedilol 25 milliGRAM(s) Oral every 12 hours  dextrose 10% Bolus 125 milliLiter(s) IV Bolus once  dextrose 5%. 1000 milliLiter(s) (50 mL/Hr) IV Continuous <Continuous>  dextrose 5%. 1000 milliLiter(s) (100 mL/Hr) IV Continuous <Continuous>  dextrose 50% Injectable 25 Gram(s) IV Push once  dextrose 50% Injectable 12.5 Gram(s) IV Push once  glucagon  Injectable 1 milliGRAM(s) IntraMuscular once  hydrALAZINE 50 milliGRAM(s) Oral three times a day  insulin lispro (ADMELOG) corrective regimen sliding scale   SubCutaneous three times a day before meals  insulin lispro (ADMELOG) corrective regimen sliding scale   SubCutaneous at bedtime    MEDICATIONS  (PRN):  dextrose Oral Gel 15 Gram(s) Oral once PRN Blood Glucose LESS THAN 70 milliGRAM(s)/deciliter  meclizine 25 milliGRAM(s) Oral three times a day PRN Dizziness      CAPILLARY BLOOD GLUCOSE      POCT Blood Glucose.: 185 mg/dL (2024 21:47)  POCT Blood Glucose.: 178 mg/dL (2024 17:24)  POCT Blood Glucose.: 89 mg/dL (2024 12:59)  POCT Blood Glucose.: 79 mg/dL (2024 08:41)    I&O's Summary    2024 07:01  -  2024 23:12  --------------------------------------------------------  IN: 60 mL / OUT: 1 mL / NET: 59 mL      T(C): 36.7 (24 @ 20:35), Max: 37.1 (24 @ 12:20)  HR: 71 (24 @ 20:35) (67 - 71)  BP: 132/68 (24 @ 20:35) (132/68 - 178/85)  RR: 17 (24 @ 20:35) (17 - 18)  SpO2: 95% (24 @ 20:35) (95% - 95%)    PHYSICAL EXAM:  GENERAL: NAD, well-developed  HEAD:  Atraumatic, Normocephalic  EYES: EOMI, PERRLA, conjunctiva and sclera clear  NECK: Supple, No JVD  CHEST/LUNG: Clear to auscultation bilaterally; No wheeze  HEART: Regular rate and rhythm; No murmurs, rubs, or gallops  ABDOMEN: Soft, Nontender, Nondistended; Bowel sounds present  EXTREMITIES:  2+ Peripheral Pulses, No clubbing, cyanosis, or edema  PSYCH: AAOx3  NEUROLOGY: non-focal  SKIN: No rashes or lesions    LABS:                        13.7   4.49  )-----------( 176      ( 10 Apr 2024 16:17 )             42.9     04-10    138  |  99  |  17  ----------------------------<  123<H>  4.8   |  23  |  0.62    Ca    9.4      10 Apr 2024 16:17  Phos  3.2     04-10  Mg     1.7     04-10    TPro  7.0  /  Alb  3.9  /  TBili  0.5  /  DBili  x   /  AST  24  /  ALT  8<L>  /  AlkPhos  68  04-10    PT/INR - ( 10 Apr 2024 21:20 )   PT: 11.4 sec;   INR: 1.09 ratio         PTT - ( 10 Apr 2024 21:20 )  PTT:31.6 sec      Urinalysis Basic - ( 10 Apr 2024 16:36 )    Color: Yellow / Appearance: Clear / S.008 / pH: x  Gluc: x / Ketone: Negative mg/dL  / Bili: Negative / Urobili: 0.2 mg/dL   Blood: x / Protein: Negative mg/dL / Nitrite: Negative   Leuk Esterase: Negative / RBC: 8 /HPF / WBC 0 /HPF   Sq Epi: x / Non Sq Epi: 0 /HPF / Bacteria: Negative /HPF        RADIOLOGY & ADDITIONAL TESTS:    Imaging Personally Reviewed:    Consultant(s) Notes Reviewed:      Care Discussed with Consultants/Other Providers:  
  Saeid Briseno MD  Interventional Cardiology / Endovascular Specialist  [place] Office : [address] [place] [address] [place] [address]  Tel:   [place] Office : [address] [facility] [place] [address]  Tel: [phone]    patient is lying in bed comfortable, NAD  	  MEDICATIONS:  aspirin enteric coated 81 milliGRAM(s) Oral daily  carvedilol 25 milliGRAM(s) Oral every 12 hours  hydrALAZINE 50 milliGRAM(s) Oral three times a day        meclizine 25 milliGRAM(s) Oral three times a day PRN      dextrose 50% Injectable 25 Gram(s) IV Push once  dextrose 50% Injectable 12.5 Gram(s) IV Push once  dextrose Oral Gel 15 Gram(s) Oral once PRN  glucagon  Injectable 1 milliGRAM(s) IntraMuscular once  insulin lispro (ADMELOG) corrective regimen sliding scale   SubCutaneous at bedtime  insulin lispro (ADMELOG) corrective regimen sliding scale   SubCutaneous three times a day before meals    dextrose 10% Bolus 125 milliLiter(s) IV Bolus once  dextrose 5%. 1000 milliLiter(s) IV Continuous <Continuous>  dextrose 5%. 1000 milliLiter(s) IV Continuous <Continuous>      PAST MEDICAL/SURGICAL HISTORY  PAST MEDICAL & SURGICAL HISTORY:  DM (diabetes mellitus), type 2      Benign essential HTN      No significant past surgical history          SOCIAL HISTORY: Substance Use (street drugs): ( x ) never used  (  ) other:    FAMILY HISTORY:      REVIEW OF SYSTEMS:  CONSTITUTIONAL: No fever, weight loss, or fatigue  EYES: No eye pain, visual disturbances, or discharge  ENMT:  No difficulty hearing, tinnitus, vertigo; No sinus or throat pain  BREASTS: No pain, masses, or nipple discharge  GASTROINTESTINAL: No abdominal or epigastric pain. No nausea, vomiting, or hematemesis; No diarrhea or constipation. No melena or hematochezia.  GENITOURINARY: No dysuria, frequency, hematuria, or incontinence  NEUROLOGICAL: No headaches, memory loss, loss of strength, numbness, or tremors  ENDOCRINE: No heat or cold intolerance; No hair loss  MUSCULOSKELETAL: No joint pain or swelling; No muscle, back, or extremity pain  PSYCHIATRIC: No depression, anxiety, mood swings, or difficulty sleeping  HEME/LYMPH: No easy bruising, or bleeding gums  All others negative    PHYSICAL EXAM:  T(C): 37.1 (04-12-24 @ 11:56), Max: 37.1 (04-12-24 @ 11:56)  HR: 73 (04-12-24 @ 11:56) (67 - 74)  BP: 158/68 (04-12-24 @ 11:56) (132/68 - 178/85)  RR: 18 (04-12-24 @ 11:56) (17 - 18)  SpO2: 95% (04-12-24 @ 11:56) (95% - 95%)  Wt(kg): --  I&O's Summary    11 Apr 2024 07:01  -  12 Apr 2024 07:00  --------------------------------------------------------  IN: 120 mL / OUT: 1 mL / NET: 119 mL          GENERAL: NAD  EYES: conjunctiva and sclera clear  ENMT: No tonsillar erythema, exudates, or enlargement  Cardiovascular: Normal S1 S2, No JVD, No murmurs, No edema  Respiratory: Lungs clear to auscultation	  Gastrointestinal:  Soft, Non-tender, + BS	  Extremities: No edema                                  13.7   4.49  )-----------( 176      ( 10 Apr 2024 16:17 )             42.9     04-10    138  |  99  |  17  ----------------------------<  123<H>  4.8   |  23  |  0.62    Ca    9.4      10 Apr 2024 16:17  Phos  3.2     04-10  Mg     1.7     04-10    TPro  7.0  /  Alb  3.9  /  TBili  0.5  /  DBili  x   /  AST  24  /  ALT  8<L>  /  AlkPhos  68  04-10    proBNP:   Lipid Profile:   HgA1c:   TSH:     Consultant(s) Notes Reviewed:  [x ] YES  [ ] NO    Care Discussed with Consultants/Other Providers [ x] YES  [ ] NO    Imaging Personally Reviewed independently:  [x] YES  [ ] NO    All labs, radiologic studies, vitals, orders and medications list reviewed. Patient is seen and examined at bedside. Case discussed with medical team.              
Patient is a 89y old  Female who presents with a chief complaint of     SUBJECTIVE / OVERNIGHT EVENTS: Patient reports Dizziness     T(C): 36.8 (24 @ 22:00), Max: 36.8 (24 @ 22:00)  HR: 65 (24 @ 22:00) (65 - 65)  BP: 146/68 (24 @ 22:00) (146/68 - 146/68)  RR: 17 (24 @ 22:00) (17 - 17)  SpO2: 97% (24 @ 22:00) (97% - 97%)    MEDICATIONS  (STANDING):  aspirin enteric coated 81 milliGRAM(s) Oral daily  dextrose 10% Bolus 125 milliLiter(s) IV Bolus once  dextrose 5%. 1000 milliLiter(s) (50 mL/Hr) IV Continuous <Continuous>  dextrose 5%. 1000 milliLiter(s) (100 mL/Hr) IV Continuous <Continuous>  dextrose 50% Injectable 25 Gram(s) IV Push once  dextrose 50% Injectable 12.5 Gram(s) IV Push once  glucagon  Injectable 1 milliGRAM(s) IntraMuscular once  hydrALAZINE 100 milliGRAM(s) Oral three times a day  insulin lispro (ADMELOG) corrective regimen sliding scale   SubCutaneous at bedtime  insulin lispro (ADMELOG) corrective regimen sliding scale   SubCutaneous three times a day before meals  meclizine 25 milliGRAM(s) Oral three times a day  metoprolol tartrate 100 milliGRAM(s) Oral two times a day    MEDICATIONS  (PRN):  dextrose Oral Gel 15 Gram(s) Oral once PRN Blood Glucose LESS THAN 70 milliGRAM(s)/deciliter        PHYSICAL EXAM:  GENERAL: NAD, well-developed  HEAD:  Atraumatic, Normocephalic  EYES: EOMI, PERRLA, conjunctiva and sclera clear  NECK: Supple, No JVD  CHEST/LUNG: Clear to auscultation bilaterally; No wheeze  HEART: Regular rate and rhythm; No murmurs, rubs, or gallops  ABDOMEN: Soft, Nontender, Nondistended; Bowel sounds present  EXTREMITIES:  2+ Peripheral Pulses, No clubbing, cyanosis, or edema  PSYCH: AAOx3  NEUROLOGY: non-focal  SKIN: No rashes or lesions    LABS:                        13.7   4.49  )-----------( 176      ( 10 Apr 2024 16:17 )             42.9     04-10    138  |  99  |  17  ----------------------------<  123<H>  4.8   |  23  |  0.62    Ca    9.4      10 Apr 2024 16:17  Phos  3.2     04-10  Mg     1.7     04-10    TPro  7.0  /  Alb  3.9  /  TBili  0.5  /  DBili  x   /  AST  24  /  ALT  8<L>  /  AlkPhos  68  04-10    PT/INR - ( 10 Apr 2024 21:20 )   PT: 11.4 sec;   INR: 1.09 ratio         PTT - ( 10 Apr 2024 21:20 )  PTT:31.6 sec      Urinalysis Basic - ( 10 Apr 2024 16:36 )    Color: Yellow / Appearance: Clear / S.008 / pH: x  Gluc: x / Ketone: Negative mg/dL  / Bili: Negative / Urobili: 0.2 mg/dL   Blood: x / Protein: Negative mg/dL / Nitrite: Negative   Leuk Esterase: Negative / RBC: 8 /HPF / WBC 0 /HPF   Sq Epi: x / Non Sq Epi: 0 /HPF / Bacteria: Negative /HPF        RADIOLOGY & ADDITIONAL TESTS:    Imaging Personally Reviewed:    Consultant(s) Notes Reviewed:      Care Discussed with Consultants/Other Providers:  
Patient is a 89y old  Female who presents with a chief complaint of     SUBJECTIVE / OVERNIGHT EVENTS: Patient reports Dizziness   T(C): 37 (24 @ 20:18), Max: 37 (24 @ 20:18)  HR: 61 (24 @ 20:18) (61 - 71)  BP: 165/70 (24 @ 20:18) (129/67 - 172/73)  RR: 17 (24 @ 20:18) (17 - 18)  SpO2: 96% (24 @ 20:18) (95% - 100%)      MEDICATIONS  (STANDING):  aspirin enteric coated 81 milliGRAM(s) Oral daily  dextrose 10% Bolus 125 milliLiter(s) IV Bolus once  dextrose 5%. 1000 milliLiter(s) (100 mL/Hr) IV Continuous <Continuous>  dextrose 5%. 1000 milliLiter(s) (50 mL/Hr) IV Continuous <Continuous>  dextrose 50% Injectable 25 Gram(s) IV Push once  dextrose 50% Injectable 12.5 Gram(s) IV Push once  glucagon  Injectable 1 milliGRAM(s) IntraMuscular once  hydrALAZINE 100 milliGRAM(s) Oral three times a day  insulin lispro (ADMELOG) corrective regimen sliding scale   SubCutaneous at bedtime  insulin lispro (ADMELOG) corrective regimen sliding scale   SubCutaneous three times a day before meals  meclizine 25 milliGRAM(s) Oral three times a day  metoprolol tartrate 100 milliGRAM(s) Oral two times a day    MEDICATIONS  (PRN):  dextrose Oral Gel 15 Gram(s) Oral once PRN Blood Glucose LESS THAN 70 milliGRAM(s)/deciliter    PHYSICAL EXAM:  GENERAL: NAD, well-developed  HEAD:  Atraumatic, Normocephalic  EYES: EOMI, PERRLA, conjunctiva and sclera clear  NECK: Supple, No JVD  CHEST/LUNG: Clear to auscultation bilaterally; No wheeze  HEART: Regular rate and rhythm; No murmurs, rubs, or gallops  ABDOMEN: Soft, Nontender, Nondistended; Bowel sounds present  EXTREMITIES:  2+ Peripheral Pulses, No clubbing, cyanosis, or edema  PSYCH: AAOx3  NEUROLOGY: non-focal  SKIN: No rashes or lesions    LABS:                        13.7   4.49  )-----------( 176      ( 10 Apr 2024 16:17 )             42.9     04-10    138  |  99  |  17  ----------------------------<  123<H>  4.8   |  23  |  0.62    Ca    9.4      10 Apr 2024 16:17  Phos  3.2     04-10  Mg     1.7     04-10    TPro  7.0  /  Alb  3.9  /  TBili  0.5  /  DBili  x   /  AST  24  /  ALT  8<L>  /  AlkPhos  68  04-10    PT/INR - ( 10 Apr 2024 21:20 )   PT: 11.4 sec;   INR: 1.09 ratio         PTT - ( 10 Apr 2024 21:20 )  PTT:31.6 sec      Urinalysis Basic - ( 10 Apr 2024 16:36 )    Color: Yellow / Appearance: Clear / S.008 / pH: x  Gluc: x / Ketone: Negative mg/dL  / Bili: Negative / Urobili: 0.2 mg/dL   Blood: x / Protein: Negative mg/dL / Nitrite: Negative   Leuk Esterase: Negative / RBC: 8 /HPF / WBC 0 /HPF   Sq Epi: x / Non Sq Epi: 0 /HPF / Bacteria: Negative /HPF        RADIOLOGY & ADDITIONAL TESTS:    Imaging Personally Reviewed:    Consultant(s) Notes Reviewed:      Care Discussed with Consultants/Other Providers:  
  Saeid Briseno MD  Interventional Cardiology / Endovascular Specialist  [place] Office : [address] [place] [address] [place] [address]  Tel:   [place] Office : [address] [facility] [place] [address]  Tel: [phone]    patient is lying in bed comfortable, NAD  	  MEDICATIONS:  aspirin enteric coated 81 milliGRAM(s) Oral daily  hydrALAZINE 100 milliGRAM(s) Oral three times a day  metoprolol tartrate 100 milliGRAM(s) Oral two times a day        meclizine 25 milliGRAM(s) Oral three times a day      dextrose 50% Injectable 25 Gram(s) IV Push once  dextrose 50% Injectable 12.5 Gram(s) IV Push once  dextrose Oral Gel 15 Gram(s) Oral once PRN  glucagon  Injectable 1 milliGRAM(s) IntraMuscular once  insulin lispro (ADMELOG) corrective regimen sliding scale   SubCutaneous at bedtime  insulin lispro (ADMELOG) corrective regimen sliding scale   SubCutaneous three times a day before meals    dextrose 10% Bolus 125 milliLiter(s) IV Bolus once  dextrose 5%. 1000 milliLiter(s) IV Continuous <Continuous>  dextrose 5%. 1000 milliLiter(s) IV Continuous <Continuous>      PAST MEDICAL/SURGICAL HISTORY  PAST MEDICAL & SURGICAL HISTORY:  DM (diabetes mellitus), type 2      Benign essential HTN      No significant past surgical history          SOCIAL HISTORY: Substance Use (street drugs): ( x ) never used  (  ) other:    FAMILY HISTORY:      REVIEW OF SYSTEMS:  CONSTITUTIONAL: No fever, weight loss, or fatigue  EYES: No eye pain, visual disturbances, or discharge  ENMT:  No difficulty hearing, tinnitus, vertigo; No sinus or throat pain  BREASTS: No pain, masses, or nipple discharge  GASTROINTESTINAL: No abdominal or epigastric pain. No nausea, vomiting, or hematemesis; No diarrhea or constipation. No melena or hematochezia.  GENITOURINARY: No dysuria, frequency, hematuria, or incontinence  NEUROLOGICAL: No headaches, memory loss, loss of strength, numbness, or tremors  ENDOCRINE: No heat or cold intolerance; No hair loss  MUSCULOSKELETAL: No joint pain or swelling; No muscle, back, or extremity pain  PSYCHIATRIC: No depression, anxiety, mood swings, or difficulty sleeping  HEME/LYMPH: No easy bruising, or bleeding gums  All others negative    PHYSICAL EXAM:  T(C): 36.8 (04-13-24 @ 19:08), Max: 36.8 (04-12-24 @ 22:00)  HR: 70 (04-13-24 @ 19:08) (65 - 71)  BP: 172/73 (04-13-24 @ 19:08) (129/67 - 172/73)  RR: 18 (04-13-24 @ 19:08) (17 - 18)  SpO2: 100% (04-13-24 @ 19:08) (95% - 100%)  Wt(kg): --  I&O's Summary    12 Apr 2024 07:01  -  13 Apr 2024 07:00  --------------------------------------------------------  IN: 180 mL / OUT: 0 mL / NET: 180 mL    13 Apr 2024 07:01  -  13 Apr 2024 19:22  --------------------------------------------------------  IN: 360 mL / OUT: 0 mL / NET: 360 mL          GENERAL: NAD  EYES: conjunctiva and sclera clear  ENMT: No tonsillar erythema, exudates, or enlargement  Cardiovascular: Normal S1 S2, No JVD, No murmurs, No edema  Respiratory: Lungs clear to auscultation	  Gastrointestinal:  Soft, Non-tender, + BS	  Extremities: No edema              04-12    140  |  101  |  18  ----------------------------<  166<H>  3.8   |  28  |  0.74    Ca    9.1      12 Apr 2024 14:36  Phos  3.1     04-12  Mg     1.7     04-12      proBNP:   Lipid Profile:   HgA1c:   TSH:     Consultant(s) Notes Reviewed:  [x ] YES  [ ] NO    Care Discussed with Consultants/Other Providers [ x] YES  [ ] NO    Imaging Personally Reviewed independently:  [x] YES  [ ] NO    All labs, radiologic studies, vitals, orders and medications list reviewed. Patient is seen and examined at bedside. Case discussed with medical team.

## 2024-04-13 NOTE — CHART NOTE - NSCHARTNOTEFT_GEN_A_CORE
Patient is a 89y old  Female who presents with a chief complaint of dizziness. alerted by rn that she currently feels dizzy. vss and fs stable. pt with history of vertigo. no tele in place. no chest pain.    Vital Signs Last 24 Hrs  T(C): 36.8 (13 Apr 2024 19:08), Max: 36.8 (12 Apr 2024 22:00)  T(F): 98.3 (13 Apr 2024 19:08), Max: 98.3 (12 Apr 2024 22:00)  HR: 70 (13 Apr 2024 19:08) (65 - 71)  BP: 172/73 (13 Apr 2024 19:08) (129/67 - 172/73)  BP(mean): --  RR: 18 (13 Apr 2024 19:08) (17 - 18)  SpO2: 100% (13 Apr 2024 19:08) (95% - 100%)    Parameters below as of 13 Apr 2024 19:08  Patient On (Oxygen Delivery Method): room air        Physical Exam:  General: WN/WD NAD  Neurology: A&Ox3, nonfocal, WELDON x 4  Head:  Normocephalic, atraumatic  Respiratory: CTA B/L  CV: RRR, S1S2, no murmur  Abdominal: Soft, NT, ND no palpable mass  MSK: No edema, + peripheral pulses, FROM all 4 extremity  Labs:      04-12    140  |  101  |  18  ----------------------------<  166<H>  3.8   |  28  |  0.74    Ca    9.1      12 Apr 2024 14:36  Phos  3.1     04-12  Mg     1.7     04-12          Assessment & Plan:  > dw dr jarquin, pt with hx vertigo, give meclizine now and reassess for efficacy.   > if improved, [facility]

## 2024-04-13 NOTE — PROGRESS NOTE ADULT - ASSESSMENT
EKG SR RBBB, LAD    Echo < from: TTE W or WO Ultrasound Enhancing Agent (04.11.24 @ 10:34) >   1. Left ventricular cavity is normal in size. Left ventricular systolic function is normal. There are no regional wall motion abnormalities seen.    < end of copied text >    Assessment and Plan     1) Pre-syncope : Denies LOC echo normal , MRI with no acute infarct ,  tele with NSVT switch carvedilol to metoprolol 100 milliGRAM(s) BID     2) HTN : switch coreg to metoprolol as above , c/w hydralazine can increase to 100 milliGRAM(s) TID if blood pressure remain elevated    3) Vertigo c/w meclizine  EKG SR RBBB, LAD    Echo < from: TTE W or WO Ultrasound Enhancing Agent (04.11.24 @ 10:34) >   1. Left ventricular cavity is normal in size. Left ventricular systolic function is normal. There are no regional wall motion abnormalities seen.    < end of copied text >    Assessment and Plan     1) Pre-syncope : Denies LOC echo normal , MRI with no acute infarct ,  tele with NSVT c/w metoprolol 100 milliGRAM(s) BID     2) HTN : metoprolol as above , c/w hydralazine can increase to 100 milliGRAM(s) TID     3) Vertigo c/w meclizine

## 2024-04-14 VITALS
SYSTOLIC BLOOD PRESSURE: 149 MMHG | DIASTOLIC BLOOD PRESSURE: 65 MMHG | RESPIRATION RATE: 18 BRPM | TEMPERATURE: 99 F | HEART RATE: 65 BPM | OXYGEN SATURATION: 95 %

## 2024-04-14 LAB
GLUCOSE BLDC GLUCOMTR-MCNC: 114 MG/DL — HIGH (ref 70–99)
GLUCOSE BLDC GLUCOMTR-MCNC: 224 MG/DL — HIGH (ref 70–99)

## 2024-04-14 PROCEDURE — 85025 COMPLETE CBC W/AUTO DIFF WBC: CPT

## 2024-04-14 PROCEDURE — 85610 PROTHROMBIN TIME: CPT

## 2024-04-14 PROCEDURE — 81001 URINALYSIS AUTO W/SCOPE: CPT

## 2024-04-14 PROCEDURE — 82435 ASSAY OF BLOOD CHLORIDE: CPT

## 2024-04-14 PROCEDURE — 83036 HEMOGLOBIN GLYCOSYLATED A1C: CPT

## 2024-04-14 PROCEDURE — A9585: CPT

## 2024-04-14 PROCEDURE — 84484 ASSAY OF TROPONIN QUANT: CPT

## 2024-04-14 PROCEDURE — 82010 KETONE BODYS QUAN: CPT

## 2024-04-14 PROCEDURE — 93306 TTE W/DOPPLER COMPLETE: CPT

## 2024-04-14 PROCEDURE — 80053 COMPREHEN METABOLIC PANEL: CPT

## 2024-04-14 PROCEDURE — 84295 ASSAY OF SERUM SODIUM: CPT

## 2024-04-14 PROCEDURE — 97116 GAIT TRAINING THERAPY: CPT

## 2024-04-14 PROCEDURE — 82803 BLOOD GASES ANY COMBINATION: CPT

## 2024-04-14 PROCEDURE — 36415 COLL VENOUS BLD VENIPUNCTURE: CPT

## 2024-04-14 PROCEDURE — 83605 ASSAY OF LACTIC ACID: CPT

## 2024-04-14 PROCEDURE — 87637 SARSCOV2&INF A&B&RSV AMP PRB: CPT

## 2024-04-14 PROCEDURE — 80048 BASIC METABOLIC PNL TOTAL CA: CPT

## 2024-04-14 PROCEDURE — 82962 GLUCOSE BLOOD TEST: CPT

## 2024-04-14 PROCEDURE — 82947 ASSAY GLUCOSE BLOOD QUANT: CPT

## 2024-04-14 PROCEDURE — 84100 ASSAY OF PHOSPHORUS: CPT

## 2024-04-14 PROCEDURE — 82330 ASSAY OF CALCIUM: CPT

## 2024-04-14 PROCEDURE — 85014 HEMATOCRIT: CPT

## 2024-04-14 PROCEDURE — 97530 THERAPEUTIC ACTIVITIES: CPT

## 2024-04-14 PROCEDURE — 99285 EMERGENCY DEPT VISIT HI MDM: CPT | Mod: 25

## 2024-04-14 PROCEDURE — 93005 ELECTROCARDIOGRAM TRACING: CPT

## 2024-04-14 PROCEDURE — 85018 HEMOGLOBIN: CPT

## 2024-04-14 PROCEDURE — 83735 ASSAY OF MAGNESIUM: CPT

## 2024-04-14 PROCEDURE — 84132 ASSAY OF SERUM POTASSIUM: CPT

## 2024-04-14 PROCEDURE — 85730 THROMBOPLASTIN TIME PARTIAL: CPT

## 2024-04-14 PROCEDURE — 97161 PT EVAL LOW COMPLEX 20 MIN: CPT

## 2024-04-14 PROCEDURE — 70450 CT HEAD/BRAIN W/O DYE: CPT | Mod: MC

## 2024-04-14 PROCEDURE — 70553 MRI BRAIN STEM W/O & W/DYE: CPT | Mod: MC

## 2024-04-14 PROCEDURE — 71045 X-RAY EXAM CHEST 1 VIEW: CPT

## 2024-04-14 PROCEDURE — 87086 URINE CULTURE/COLONY COUNT: CPT

## 2024-04-14 PROCEDURE — 83880 ASSAY OF NATRIURETIC PEPTIDE: CPT

## 2024-04-14 RX ADMIN — Medication 100 MILLIGRAM(S): at 05:16

## 2024-04-14 RX ADMIN — Medication 81 MILLIGRAM(S): at 12:49

## 2024-04-14 RX ADMIN — Medication 25 MILLIGRAM(S): at 05:16

## 2024-04-17 PROBLEM — N39.0 URINARY TRACT INFECTION, SITE NOT SPECIFIED: Chronic | Status: ACTIVE | Noted: 2023-10-10

## 2024-04-17 PROBLEM — E11.9 TYPE 2 DIABETES MELLITUS WITHOUT COMPLICATIONS: Chronic | Status: ACTIVE | Noted: 2023-10-10

## 2024-04-17 RX ORDER — ASPIRIN/CALCIUM CARB/MAGNESIUM 324 MG
1 TABLET ORAL
Refills: 0 | DISCHARGE

## 2024-04-17 RX ORDER — MECLIZINE HCL 12.5 MG
1 TABLET ORAL
Refills: 0 | DISCHARGE

## 2024-04-17 RX ORDER — HYDRALAZINE HCL 50 MG
1 TABLET ORAL
Refills: 0 | DISCHARGE

## 2024-04-17 RX ORDER — HYDROCHLOROTHIAZIDE 25 MG
1 TABLET ORAL
Refills: 0 | DISCHARGE

## 2024-04-17 RX ORDER — METFORMIN HYDROCHLORIDE 850 MG/1
1 TABLET ORAL
Refills: 0 | DISCHARGE

## 2024-04-17 RX ORDER — CARVEDILOL PHOSPHATE 80 MG/1
1 CAPSULE, EXTENDED RELEASE ORAL
Refills: 0 | DISCHARGE

## 2024-05-21 NOTE — DISCHARGE NOTE NURSING/CASE MANAGEMENT/SOCIAL WORK - NSDPLANG ASIS_GEN_ALL_CORE
BP is controlled on HCTz and diltiazem .    
On atorvastatin and check labs.    
On ursodiol and check labs    
Will see urogyne    
No
